# Patient Record
Sex: MALE | Race: WHITE | NOT HISPANIC OR LATINO | Employment: UNEMPLOYED | URBAN - METROPOLITAN AREA
[De-identification: names, ages, dates, MRNs, and addresses within clinical notes are randomized per-mention and may not be internally consistent; named-entity substitution may affect disease eponyms.]

---

## 2017-05-22 ENCOUNTER — HISTORICAL (OUTPATIENT)
Dept: ADMINISTRATIVE | Facility: HOSPITAL | Age: 27
End: 2017-05-22

## 2017-07-28 ENCOUNTER — HISTORICAL (OUTPATIENT)
Dept: ADMINISTRATIVE | Facility: HOSPITAL | Age: 27
End: 2017-07-28

## 2017-07-28 LAB
ABS NEUT (OLG): 2.6 X10(3)/MCL (ref 2.1–9.2)
ALBUMIN SERPL-MCNC: 3.8 GM/DL (ref 3.4–5)
ALBUMIN/GLOB SERPL: 1 RATIO (ref 1–2)
ALP SERPL-CCNC: 71 UNIT/L (ref 45–117)
ALT SERPL-CCNC: 30 UNIT/L (ref 12–78)
AMPHET UR QL SCN: POSITIVE
APAP SERPL-MCNC: <2 MCG/ML (ref 10–30)
APPEARANCE, UA: CLEAR
AST SERPL-CCNC: 17 UNIT/L (ref 15–37)
BACTERIA #/AREA URNS AUTO: ABNORMAL /[HPF]
BARBITURATE SCN PRESENT UR: NEGATIVE
BASOPHILS # BLD AUTO: 0.04 X10(3)/MCL
BASOPHILS NFR BLD AUTO: 1 % (ref 0–1)
BENZODIAZ UR QL SCN: POSITIVE
BILIRUB SERPL-MCNC: 0.2 MG/DL (ref 0.2–1)
BILIRUB UR QL STRIP: NEGATIVE
BILIRUBIN DIRECT+TOT PNL SERPL-MCNC: <0.1 MG/DL
BILIRUBIN DIRECT+TOT PNL SERPL-MCNC: >0.1 MG/DL
BUN SERPL-MCNC: 5 MG/DL (ref 7–18)
CALCIUM SERPL-MCNC: 9 MG/DL (ref 8.5–10.1)
CANNABINOIDS UR QL SCN: POSITIVE
CHLORIDE SERPL-SCNC: 107 MMOL/L (ref 98–107)
CO2 SERPL-SCNC: 29 MMOL/L (ref 21–32)
COCAINE UR QL SCN: NEGATIVE
COLOR UR: YELLOW
CREAT SERPL-MCNC: 1 MG/DL (ref 0.6–1.3)
EOSINOPHIL # BLD AUTO: 0.1 10*3/UL
EOSINOPHIL NFR BLD AUTO: 2 % (ref 0–5)
ERYTHROCYTE [DISTWIDTH] IN BLOOD BY AUTOMATED COUNT: 11.9 % (ref 11.5–14.5)
ETHANOL SERPL-MCNC: <3 MG/DL
GLOBULIN SER-MCNC: 3.8 GM/ML (ref 2.3–3.5)
GLUCOSE (UA): NORMAL
GLUCOSE SERPL-MCNC: 94 MG/DL (ref 74–106)
HCT VFR BLD AUTO: 42.6 % (ref 40–51)
HGB BLD-MCNC: 15.3 GM/DL (ref 13.5–17.5)
HGB UR QL STRIP: NEGATIVE
HYALINE CASTS #/AREA URNS LPF: ABNORMAL /[LPF]
IMM GRANULOCYTES # BLD AUTO: 0.01 10*3/UL
IMM GRANULOCYTES NFR BLD AUTO: 0 %
KETONES UR QL STRIP: ABNORMAL
LEUKOCYTE ESTERASE UR QL STRIP: NEGATIVE
LYMPHOCYTES # BLD AUTO: 1.17 X10(3)/MCL
LYMPHOCYTES NFR BLD AUTO: 27 % (ref 15–40)
MCH RBC QN AUTO: 31.9 PG (ref 26–34)
MCHC RBC AUTO-ENTMCNC: 35.9 GM/DL (ref 31–37)
MCV RBC AUTO: 88.9 FL (ref 80–100)
MONOCYTES # BLD AUTO: 0.37 X10(3)/MCL
MONOCYTES NFR BLD AUTO: 9 % (ref 4–12)
NEUTROPHILS # BLD AUTO: 2.6 X10(3)/MCL
NEUTROPHILS NFR BLD AUTO: 61 X10(3)/MCL
NITRITE UR QL STRIP: NEGATIVE
OPIATES UR QL SCN: POSITIVE
PCP UR QL: NEGATIVE
PH UR STRIP.AUTO: 6 [PH] (ref 5–8)
PH UR STRIP: 6 [PH] (ref 4.5–8)
PLATELET # BLD AUTO: 338 X10(3)/MCL (ref 130–400)
PMV BLD AUTO: 9 FL (ref 7.4–10.4)
POTASSIUM SERPL-SCNC: 4 MMOL/L (ref 3.5–5.1)
PROT SERPL-MCNC: 7.6 GM/DL (ref 6.4–8.2)
PROT UR QL STRIP: 50 MG/DL
RBC # BLD AUTO: 4.79 X10(6)/MCL (ref 4.5–5.9)
RBC #/AREA URNS AUTO: ABNORMAL /[HPF]
SALICYLATES SERPL-MCNC: <1.7 MG/DL (ref 2.8–20)
SODIUM SERPL-SCNC: 143 MMOL/L (ref 136–145)
SP GR UR STRIP: 1.03 (ref 1–1.03)
SQUAMOUS #/AREA URNS LPF: ABNORMAL /[LPF]
T4 FREE SERPL-MCNC: 0.97 NG/DL (ref 0.76–1.46)
TEMPERATURE, URINE (OHS): 22.5 DEGC (ref 20–25)
TSH SERPL-ACNC: 0.6 MIU/L (ref 0.36–3.74)
UROBILINOGEN UR STRIP-ACNC: 2 MG/DL
WBC # SPEC AUTO: 4.3 X10(3)/MCL (ref 4.5–11)
WBC #/AREA URNS AUTO: ABNORMAL /HPF

## 2018-03-23 ENCOUNTER — HISTORICAL (OUTPATIENT)
Dept: ADMINISTRATIVE | Facility: HOSPITAL | Age: 28
End: 2018-03-23

## 2018-03-29 LAB
FINAL CULTURE: NORMAL
FINAL CULTURE: NORMAL

## 2020-12-27 ENCOUNTER — HISTORICAL (OUTPATIENT)
Dept: ADMINISTRATIVE | Facility: HOSPITAL | Age: 30
End: 2020-12-27

## 2022-09-06 ENCOUNTER — HOSPITAL ENCOUNTER (OUTPATIENT)
Facility: HOSPITAL | Age: 32
Discharge: LEFT BEFORE DISCHARGE | DRG: 872 | End: 2022-09-07
Attending: STUDENT IN AN ORGANIZED HEALTH CARE EDUCATION/TRAINING PROGRAM | Admitting: INTERNAL MEDICINE
Payer: MEDICAID

## 2022-09-06 DIAGNOSIS — R00.0 TACHYCARDIA: ICD-10-CM

## 2022-09-06 DIAGNOSIS — K81.9 CHOLECYSTITIS: Primary | ICD-10-CM

## 2022-09-06 DIAGNOSIS — R10.9 FLANK PAIN: ICD-10-CM

## 2022-09-06 DIAGNOSIS — A41.9 SEPSIS WITHOUT ACUTE ORGAN DYSFUNCTION, DUE TO UNSPECIFIED ORGANISM: ICD-10-CM

## 2022-09-06 DIAGNOSIS — F19.90 IVDU (INTRAVENOUS DRUG USER): ICD-10-CM

## 2022-09-06 DIAGNOSIS — I47.10 SVT (SUPRAVENTRICULAR TACHYCARDIA): ICD-10-CM

## 2022-09-06 PROCEDURE — 96375 TX/PRO/DX INJ NEW DRUG ADDON: CPT

## 2022-09-06 PROCEDURE — G0378 HOSPITAL OBSERVATION PER HR: HCPCS

## 2022-09-06 PROCEDURE — 99291 CRITICAL CARE FIRST HOUR: CPT | Mod: 25

## 2022-09-06 PROCEDURE — 96374 THER/PROPH/DIAG INJ IV PUSH: CPT

## 2022-09-06 PROCEDURE — G0379 DIRECT REFER HOSPITAL OBSERV: HCPCS

## 2022-09-06 PROCEDURE — 21400001 HC TELEMETRY ROOM

## 2022-09-06 PROCEDURE — 96361 HYDRATE IV INFUSION ADD-ON: CPT

## 2022-09-06 RX ORDER — HYDROMORPHONE HYDROCHLORIDE 2 MG/ML
1 INJECTION, SOLUTION INTRAMUSCULAR; INTRAVENOUS; SUBCUTANEOUS
Status: COMPLETED | OUTPATIENT
Start: 2022-09-07 | End: 2022-09-07

## 2022-09-06 NOTE — Clinical Note
Diagnosis: Cholecystitis [114618]   Admitting Provider:: BOLIVAR HAGEN [[id number]]   Future Attending Provider: BOLIVAR HAGEN [[id number]]   Reason for IP Medical Treatment  (Clinical interventions that can only be accomplished in the IP setting? ) :: Surgery, IV fluids, IV antibiotics   Estimated Length of Stay:: 2 midnights   I certify that Inpatient services for greater than or equal to 2 midnights are medically necessary:: Yes   Plans for Post-Acute care--if anticipated (pick the single best option):: A. No post acute care anticipated at this time

## 2022-09-07 ENCOUNTER — HOSPITAL ENCOUNTER (OUTPATIENT)
Facility: HOSPITAL | Age: 32
LOS: 1 days | Discharge: LEFT BEFORE DISCHARGE | End: 2022-09-07
Attending: STUDENT IN AN ORGANIZED HEALTH CARE EDUCATION/TRAINING PROGRAM | Admitting: INTERNAL MEDICINE
Payer: MEDICAID

## 2022-09-07 VITALS
SYSTOLIC BLOOD PRESSURE: 116 MMHG | TEMPERATURE: 99 F | DIASTOLIC BLOOD PRESSURE: 66 MMHG | HEIGHT: 70 IN | RESPIRATION RATE: 12 BRPM | OXYGEN SATURATION: 98 % | BODY MASS INDEX: 24.34 KG/M2 | HEART RATE: 112 BPM | WEIGHT: 170 LBS

## 2022-09-07 VITALS
HEIGHT: 70 IN | RESPIRATION RATE: 17 BRPM | HEART RATE: 84 BPM | TEMPERATURE: 97 F | WEIGHT: 188 LBS | DIASTOLIC BLOOD PRESSURE: 88 MMHG | BODY MASS INDEX: 26.92 KG/M2 | SYSTOLIC BLOOD PRESSURE: 137 MMHG | OXYGEN SATURATION: 100 %

## 2022-09-07 DIAGNOSIS — F19.90 IVDU (INTRAVENOUS DRUG USER): ICD-10-CM

## 2022-09-07 DIAGNOSIS — I33.0 VEGETATIVE ENDOCARDITIS: ICD-10-CM

## 2022-09-07 DIAGNOSIS — I47.10 SVT (SUPRAVENTRICULAR TACHYCARDIA): ICD-10-CM

## 2022-09-07 DIAGNOSIS — K81.9 CHOLECYSTITIS: Primary | ICD-10-CM

## 2022-09-07 DIAGNOSIS — A41.9 SEPSIS, DUE TO UNSPECIFIED ORGANISM, UNSPECIFIED WHETHER ACUTE ORGAN DYSFUNCTION PRESENT: ICD-10-CM

## 2022-09-07 PROBLEM — F19.10 POLYSUBSTANCE ABUSE: Status: ACTIVE | Noted: 2022-09-07

## 2022-09-07 PROBLEM — K80.00 CALCULUS OF GALLBLADDER WITH ACUTE CHOLECYSTITIS WITHOUT OBSTRUCTION: Status: ACTIVE | Noted: 2022-09-07

## 2022-09-07 PROBLEM — E83.42 HYPOMAGNESEMIA: Status: ACTIVE | Noted: 2022-09-07

## 2022-09-07 LAB
ABS NEUT CALC (OHS): 1.79 X10(3)/MCL (ref 2.1–9.2)
ALBUMIN SERPL-MCNC: 3.2 GM/DL (ref 3.5–5)
ALBUMIN SERPL-MCNC: 3.2 GM/DL (ref 3.5–5)
ALBUMIN/GLOB SERPL: 1 RATIO (ref 1.1–2)
ALBUMIN/GLOB SERPL: 1 RATIO (ref 1.1–2)
ALP SERPL-CCNC: 103 UNIT/L (ref 40–150)
ALP SERPL-CCNC: 105 UNIT/L (ref 40–150)
ALT SERPL-CCNC: 10 UNIT/L (ref 0–55)
ALT SERPL-CCNC: 34 UNIT/L (ref 0–55)
AMPHET UR QL SCN: POSITIVE
APPEARANCE UR: CLEAR
APTT PPP: 30.8 SECONDS (ref 23.2–33.7)
AST SERPL-CCNC: 15 UNIT/L (ref 5–34)
AST SERPL-CCNC: 59 UNIT/L (ref 5–34)
BACTERIA #/AREA URNS AUTO: ABNORMAL /HPF
BARBITURATE SCN PRESENT UR: NEGATIVE
BASOPHILS # BLD AUTO: 0.02 X10(3)/MCL (ref 0–0.2)
BASOPHILS NFR BLD AUTO: 0.2 %
BENZODIAZ UR QL SCN: NEGATIVE
BILIRUB UR QL STRIP.AUTO: NEGATIVE MG/DL
BILIRUBIN DIRECT+TOT PNL SERPL-MCNC: 0.7 MG/DL
BILIRUBIN DIRECT+TOT PNL SERPL-MCNC: 1.1 MG/DL
BUN SERPL-MCNC: 8 MG/DL (ref 8.9–20.6)
BUN SERPL-MCNC: 9 MG/DL (ref 8.9–20.6)
CALCIUM SERPL-MCNC: 9 MG/DL (ref 8.4–10.2)
CALCIUM SERPL-MCNC: 9.4 MG/DL (ref 8.4–10.2)
CANNABINOIDS UR QL SCN: POSITIVE
CHLORIDE SERPL-SCNC: 104 MMOL/L (ref 98–107)
CHLORIDE SERPL-SCNC: 107 MMOL/L (ref 98–107)
CO2 SERPL-SCNC: 23 MMOL/L (ref 22–29)
CO2 SERPL-SCNC: 24 MMOL/L (ref 22–29)
COCAINE UR QL SCN: NEGATIVE
COLOR UR AUTO: YELLOW
CREAT SERPL-MCNC: 0.96 MG/DL (ref 0.73–1.18)
CREAT SERPL-MCNC: 1.04 MG/DL (ref 0.73–1.18)
EOSINOPHIL # BLD AUTO: 0.02 X10(3)/MCL (ref 0–0.9)
EOSINOPHIL NFR BLD AUTO: 0.2 %
EOSINOPHIL NFR BLD MANUAL: 0.05 X10(3)/MCL (ref 0–0.9)
EOSINOPHIL NFR BLD MANUAL: 2 % (ref 0–8)
ERYTHROCYTE [DISTWIDTH] IN BLOOD BY AUTOMATED COUNT: 12.4 % (ref 11.5–17)
ERYTHROCYTE [DISTWIDTH] IN BLOOD BY AUTOMATED COUNT: 12.8 % (ref 11.5–17)
ETHANOL SERPL-MCNC: <10 MG/DL
FENTANYL UR QL SCN: POSITIVE
FLUAV AG UPPER RESP QL IA.RAPID: NOT DETECTED
FLUBV AG UPPER RESP QL IA.RAPID: NOT DETECTED
GFR SERPLBLD CREATININE-BSD FMLA CKD-EPI: >60 MLS/MIN/1.73/M2
GFR SERPLBLD CREATININE-BSD FMLA CKD-EPI: >60 MLS/MIN/1.73/M2
GLOBULIN SER-MCNC: 3.1 GM/DL (ref 2.4–3.5)
GLOBULIN SER-MCNC: 3.2 GM/DL (ref 2.4–3.5)
GLUCOSE SERPL-MCNC: 101 MG/DL (ref 74–100)
GLUCOSE SERPL-MCNC: 96 MG/DL (ref 74–100)
GLUCOSE UR QL STRIP.AUTO: NEGATIVE MG/DL
HCT VFR BLD AUTO: 36.5 % (ref 42–52)
HCT VFR BLD AUTO: 38.7 % (ref 42–52)
HGB BLD-MCNC: 11.9 GM/DL (ref 14–18)
HGB BLD-MCNC: 12.6 GM/DL (ref 14–18)
HIV 1+2 AB+HIV1 P24 AG SERPL QL IA: NONREACTIVE
IMM GRANULOCYTES # BLD AUTO: 0.02 X10(3)/MCL (ref 0–0.04)
IMM GRANULOCYTES # BLD AUTO: 0.03 X10(3)/MCL (ref 0–0.04)
IMM GRANULOCYTES NFR BLD AUTO: 0.3 %
IMM GRANULOCYTES NFR BLD AUTO: 0.9 %
INR BLD: 1.25 (ref 0–1.3)
KETONES UR QL STRIP.AUTO: NEGATIVE MG/DL
LACTATE SERPL-SCNC: 3 MMOL/L (ref 0.5–2.2)
LACTATE SERPL-SCNC: 3.6 MMOL/L (ref 0.5–2.2)
LACTATE SERPL-SCNC: 4.3 MMOL/L (ref 0.5–2.2)
LACTATE SERPL-SCNC: 4.4 MMOL/L (ref 0.5–2.2)
LACTATE SERPL-SCNC: 4.4 MMOL/L (ref 0.5–2.2)
LACTATE SERPL-SCNC: 4.5 MMOL/L (ref 0.5–2.2)
LACTATE SERPL-SCNC: 4.6 MMOL/L (ref 0.5–2.2)
LEUKOCYTE ESTERASE UR QL STRIP.AUTO: NEGATIVE UNIT/L
LIPASE SERPL-CCNC: 13 U/L
LYMPHOCYTES # BLD AUTO: 0.32 X10(3)/MCL (ref 0.6–4.6)
LYMPHOCYTES NFR BLD AUTO: 3.4 %
LYMPHOCYTES NFR BLD MANUAL: 0.34 X10(3)/MCL
LYMPHOCYTES NFR BLD MANUAL: 15 % (ref 13–40)
MAGNESIUM SERPL-MCNC: 1.3 MG/DL (ref 1.6–2.6)
MCH RBC QN AUTO: 29.2 PG (ref 27–31)
MCH RBC QN AUTO: 29.4 PG (ref 27–31)
MCHC RBC AUTO-ENTMCNC: 32.6 MG/DL (ref 33–36)
MCHC RBC AUTO-ENTMCNC: 32.6 MG/DL (ref 33–36)
MCV RBC AUTO: 89.6 FL (ref 80–94)
MCV RBC AUTO: 90.1 FL (ref 80–94)
MDMA UR QL SCN: NEGATIVE
MONOCYTES # BLD AUTO: 0.11 X10(3)/MCL (ref 0.1–1.3)
MONOCYTES NFR BLD AUTO: 1.2 %
MONOCYTES NFR BLD MANUAL: 0.12 X10(3)/MCL (ref 0.1–1.3)
MONOCYTES NFR BLD MANUAL: 5 % (ref 2–11)
NEUTROPHILS # BLD AUTO: 8.9 X10(3)/MCL (ref 2.1–9.2)
NEUTROPHILS NFR BLD AUTO: 94.7 %
NEUTROPHILS NFR BLD MANUAL: 77 % (ref 47–80)
NEUTS BAND NFR BLD MANUAL: 1 % (ref 0–11)
NITRITE UR QL STRIP.AUTO: NEGATIVE
OPIATES UR QL SCN: NEGATIVE
PCP UR QL: NEGATIVE
PH UR STRIP.AUTO: 7 [PH]
PH UR: 7 [PH] (ref 3–11)
PLATELET # BLD AUTO: 132 X10(3)/MCL (ref 130–400)
PLATELET # BLD AUTO: 144 X10(3)/MCL (ref 130–400)
PLATELET # BLD EST: ADEQUATE 10*3/UL
PMV BLD AUTO: 8.1 FL (ref 7.4–10.4)
PMV BLD AUTO: 8.7 FL (ref 7.4–10.4)
POTASSIUM SERPL-SCNC: 3.6 MMOL/L (ref 3.5–5.1)
POTASSIUM SERPL-SCNC: 4.4 MMOL/L (ref 3.5–5.1)
PROT SERPL-MCNC: 6.3 GM/DL (ref 6.4–8.3)
PROT SERPL-MCNC: 6.4 GM/DL (ref 6.4–8.3)
PROT UR QL STRIP.AUTO: NEGATIVE MG/DL
PROTHROMBIN TIME: 15.5 SECONDS (ref 12.5–14.5)
RBC # BLD AUTO: 4.05 X10(6)/MCL (ref 4.7–6.1)
RBC # BLD AUTO: 4.32 X10(6)/MCL (ref 4.7–6.1)
RBC #/AREA URNS AUTO: ABNORMAL /HPF
RBC MORPH BLD: NORMAL
RBC UR QL AUTO: ABNORMAL UNIT/L
SARS-COV-2 RNA RESP QL NAA+PROBE: NOT DETECTED
SODIUM SERPL-SCNC: 140 MMOL/L (ref 136–145)
SODIUM SERPL-SCNC: 143 MMOL/L (ref 136–145)
SP GR UR STRIP.AUTO: 1.01
SPECIFIC GRAVITY, URINE AUTO (.000) (OHS): 1.01 (ref 1–1.03)
SPERM URNS QL MICRO: ABNORMAL /HPF
SQUAMOUS #/AREA URNS AUTO: ABNORMAL /HPF
TROPONIN I SERPL-MCNC: <0.01 NG/ML (ref 0–0.04)
TSH SERPL-ACNC: 0.45 UIU/ML (ref 0.35–4.94)
UROBILINOGEN UR STRIP-ACNC: 0.2 MG/DL
WBC # SPEC AUTO: 2.3 X10(3)/MCL (ref 4.5–11.5)
WBC # SPEC AUTO: 9.4 X10(3)/MCL (ref 4.5–11.5)
WBC #/AREA URNS AUTO: ABNORMAL /HPF

## 2022-09-07 PROCEDURE — 99285 EMERGENCY DEPT VISIT HI MDM: CPT | Mod: 25

## 2022-09-07 PROCEDURE — 96374 THER/PROPH/DIAG INJ IV PUSH: CPT | Mod: 59

## 2022-09-07 PROCEDURE — 25000003 PHARM REV CODE 250: Performed by: STUDENT IN AN ORGANIZED HEALTH CARE EDUCATION/TRAINING PROGRAM

## 2022-09-07 PROCEDURE — 85730 THROMBOPLASTIN TIME PARTIAL: CPT | Performed by: STUDENT IN AN ORGANIZED HEALTH CARE EDUCATION/TRAINING PROGRAM

## 2022-09-07 PROCEDURE — 85025 COMPLETE CBC W/AUTO DIFF WBC: CPT | Performed by: STUDENT IN AN ORGANIZED HEALTH CARE EDUCATION/TRAINING PROGRAM

## 2022-09-07 PROCEDURE — 63600175 PHARM REV CODE 636 W HCPCS: Performed by: INTERNAL MEDICINE

## 2022-09-07 PROCEDURE — 85610 PROTHROMBIN TIME: CPT | Performed by: STUDENT IN AN ORGANIZED HEALTH CARE EDUCATION/TRAINING PROGRAM

## 2022-09-07 PROCEDURE — 82077 ASSAY SPEC XCP UR&BREATH IA: CPT | Performed by: STUDENT IN AN ORGANIZED HEALTH CARE EDUCATION/TRAINING PROGRAM

## 2022-09-07 PROCEDURE — 96365 THER/PROPH/DIAG IV INF INIT: CPT | Mod: 59

## 2022-09-07 PROCEDURE — 87040 BLOOD CULTURE FOR BACTERIA: CPT | Performed by: STUDENT IN AN ORGANIZED HEALTH CARE EDUCATION/TRAINING PROGRAM

## 2022-09-07 PROCEDURE — 83735 ASSAY OF MAGNESIUM: CPT | Performed by: STUDENT IN AN ORGANIZED HEALTH CARE EDUCATION/TRAINING PROGRAM

## 2022-09-07 PROCEDURE — G0378 HOSPITAL OBSERVATION PER HR: HCPCS

## 2022-09-07 PROCEDURE — 80053 COMPREHEN METABOLIC PANEL: CPT | Performed by: STUDENT IN AN ORGANIZED HEALTH CARE EDUCATION/TRAINING PROGRAM

## 2022-09-07 PROCEDURE — 80307 DRUG TEST PRSMV CHEM ANLYZR: CPT | Performed by: STUDENT IN AN ORGANIZED HEALTH CARE EDUCATION/TRAINING PROGRAM

## 2022-09-07 PROCEDURE — 63600175 PHARM REV CODE 636 W HCPCS: Performed by: STUDENT IN AN ORGANIZED HEALTH CARE EDUCATION/TRAINING PROGRAM

## 2022-09-07 PROCEDURE — 63600175 PHARM REV CODE 636 W HCPCS: Performed by: SURGERY

## 2022-09-07 PROCEDURE — 84484 ASSAY OF TROPONIN QUANT: CPT | Performed by: STUDENT IN AN ORGANIZED HEALTH CARE EDUCATION/TRAINING PROGRAM

## 2022-09-07 PROCEDURE — 93005 ELECTROCARDIOGRAM TRACING: CPT

## 2022-09-07 PROCEDURE — 83605 ASSAY OF LACTIC ACID: CPT | Performed by: STUDENT IN AN ORGANIZED HEALTH CARE EDUCATION/TRAINING PROGRAM

## 2022-09-07 PROCEDURE — 84443 ASSAY THYROID STIM HORMONE: CPT | Performed by: STUDENT IN AN ORGANIZED HEALTH CARE EDUCATION/TRAINING PROGRAM

## 2022-09-07 PROCEDURE — 81001 URINALYSIS AUTO W/SCOPE: CPT | Performed by: STUDENT IN AN ORGANIZED HEALTH CARE EDUCATION/TRAINING PROGRAM

## 2022-09-07 PROCEDURE — 87636 SARSCOV2 & INF A&B AMP PRB: CPT | Performed by: STUDENT IN AN ORGANIZED HEALTH CARE EDUCATION/TRAINING PROGRAM

## 2022-09-07 PROCEDURE — 96375 TX/PRO/DX INJ NEW DRUG ADDON: CPT

## 2022-09-07 PROCEDURE — 83690 ASSAY OF LIPASE: CPT | Performed by: STUDENT IN AN ORGANIZED HEALTH CARE EDUCATION/TRAINING PROGRAM

## 2022-09-07 PROCEDURE — 96361 HYDRATE IV INFUSION ADD-ON: CPT

## 2022-09-07 PROCEDURE — 85027 COMPLETE CBC AUTOMATED: CPT | Performed by: STUDENT IN AN ORGANIZED HEALTH CARE EDUCATION/TRAINING PROGRAM

## 2022-09-07 PROCEDURE — 36415 COLL VENOUS BLD VENIPUNCTURE: CPT | Performed by: STUDENT IN AN ORGANIZED HEALTH CARE EDUCATION/TRAINING PROGRAM

## 2022-09-07 PROCEDURE — S0030 INJECTION, METRONIDAZOLE: HCPCS | Performed by: STUDENT IN AN ORGANIZED HEALTH CARE EDUCATION/TRAINING PROGRAM

## 2022-09-07 PROCEDURE — 87389 HIV-1 AG W/HIV-1&-2 AB AG IA: CPT | Performed by: STUDENT IN AN ORGANIZED HEALTH CARE EDUCATION/TRAINING PROGRAM

## 2022-09-07 PROCEDURE — 25500020 PHARM REV CODE 255: Performed by: STUDENT IN AN ORGANIZED HEALTH CARE EDUCATION/TRAINING PROGRAM

## 2022-09-07 RX ORDER — METRONIDAZOLE 500 MG/100ML
500 INJECTION, SOLUTION INTRAVENOUS
Status: DISCONTINUED | OUTPATIENT
Start: 2022-09-07 | End: 2022-09-07 | Stop reason: HOSPADM

## 2022-09-07 RX ORDER — MAGNESIUM SULFATE HEPTAHYDRATE 40 MG/ML
4 INJECTION, SOLUTION INTRAVENOUS ONCE
Status: COMPLETED | OUTPATIENT
Start: 2022-09-07 | End: 2022-09-07

## 2022-09-07 RX ORDER — SODIUM CHLORIDE 0.9 % (FLUSH) 0.9 %
10 SYRINGE (ML) INJECTION
Status: DISCONTINUED | OUTPATIENT
Start: 2022-09-07 | End: 2022-09-07 | Stop reason: HOSPADM

## 2022-09-07 RX ORDER — DEXTROSE MONOHYDRATE AND SODIUM CHLORIDE 5; .9 G/100ML; G/100ML
INJECTION, SOLUTION INTRAVENOUS CONTINUOUS
Status: DISCONTINUED | OUTPATIENT
Start: 2022-09-07 | End: 2022-09-07 | Stop reason: HOSPADM

## 2022-09-07 RX ORDER — MAGNESIUM SULFATE HEPTAHYDRATE 40 MG/ML
2 INJECTION, SOLUTION INTRAVENOUS
Status: COMPLETED | OUTPATIENT
Start: 2022-09-07 | End: 2022-09-07

## 2022-09-07 RX ORDER — SODIUM CHLORIDE, SODIUM LACTATE, POTASSIUM CHLORIDE, CALCIUM CHLORIDE 600; 310; 30; 20 MG/100ML; MG/100ML; MG/100ML; MG/100ML
1000 INJECTION, SOLUTION INTRAVENOUS
Status: COMPLETED | OUTPATIENT
Start: 2022-09-07 | End: 2022-09-07

## 2022-09-07 RX ORDER — HYDROMORPHONE HYDROCHLORIDE 2 MG/ML
0.5 INJECTION, SOLUTION INTRAMUSCULAR; INTRAVENOUS; SUBCUTANEOUS EVERY 4 HOURS PRN
Status: DISCONTINUED | OUTPATIENT
Start: 2022-09-07 | End: 2022-09-07 | Stop reason: HOSPADM

## 2022-09-07 RX ORDER — ONDANSETRON 2 MG/ML
4 INJECTION INTRAMUSCULAR; INTRAVENOUS EVERY 8 HOURS PRN
Status: DISCONTINUED | OUTPATIENT
Start: 2022-09-07 | End: 2022-09-07 | Stop reason: HOSPADM

## 2022-09-07 RX ORDER — TALC
6 POWDER (GRAM) TOPICAL NIGHTLY PRN
Status: DISCONTINUED | OUTPATIENT
Start: 2022-09-07 | End: 2022-09-07 | Stop reason: HOSPADM

## 2022-09-07 RX ORDER — KETOROLAC TROMETHAMINE 30 MG/ML
30 INJECTION, SOLUTION INTRAMUSCULAR; INTRAVENOUS
Status: COMPLETED | OUTPATIENT
Start: 2022-09-07 | End: 2022-09-07

## 2022-09-07 RX ADMIN — PIPERACILLIN SODIUM AND TAZOBACTAM SODIUM 4.5 G: 4; .5 INJECTION, POWDER, LYOPHILIZED, FOR SOLUTION INTRAVENOUS at 03:09

## 2022-09-07 RX ADMIN — METRONIDAZOLE 500 MG: 500 INJECTION, SOLUTION INTRAVENOUS at 06:09

## 2022-09-07 RX ADMIN — HYDROMORPHONE HYDROCHLORIDE 0.5 MG: 2 INJECTION, SOLUTION INTRAMUSCULAR; INTRAVENOUS; SUBCUTANEOUS at 03:09

## 2022-09-07 RX ADMIN — SODIUM CHLORIDE, POTASSIUM CHLORIDE, SODIUM LACTATE AND CALCIUM CHLORIDE 1000 ML: 600; 310; 30; 20 INJECTION, SOLUTION INTRAVENOUS at 03:09

## 2022-09-07 RX ADMIN — IOPAMIDOL 100 ML: 755 INJECTION, SOLUTION INTRAVENOUS at 02:09

## 2022-09-07 RX ADMIN — MAGNESIUM SULFATE HEPTAHYDRATE 4 G: 40 INJECTION, SOLUTION INTRAVENOUS at 11:09

## 2022-09-07 RX ADMIN — SODIUM CHLORIDE, POTASSIUM CHLORIDE, SODIUM LACTATE AND CALCIUM CHLORIDE 1000 ML: 600; 310; 30; 20 INJECTION, SOLUTION INTRAVENOUS at 12:09

## 2022-09-07 RX ADMIN — DEXTROSE AND SODIUM CHLORIDE: 5; 900 INJECTION, SOLUTION INTRAVENOUS at 10:09

## 2022-09-07 RX ADMIN — KETOROLAC TROMETHAMINE 30 MG: 30 INJECTION, SOLUTION INTRAMUSCULAR; INTRAVENOUS at 12:09

## 2022-09-07 RX ADMIN — HYDROMORPHONE HYDROCHLORIDE 1 MG: 2 INJECTION, SOLUTION INTRAMUSCULAR; INTRAVENOUS; SUBCUTANEOUS at 12:09

## 2022-09-07 RX ADMIN — METRONIDAZOLE 500 MG: 500 INJECTION, SOLUTION INTRAVENOUS at 03:09

## 2022-09-07 RX ADMIN — ACETAMINOPHEN 975 MG: 325 SUPPOSITORY RECTAL at 12:09

## 2022-09-07 RX ADMIN — ONDANSETRON 4 MG: 2 INJECTION INTRAMUSCULAR; INTRAVENOUS at 02:09

## 2022-09-07 RX ADMIN — ONDANSETRON 4 MG: 2 INJECTION INTRAMUSCULAR; INTRAVENOUS at 11:09

## 2022-09-07 RX ADMIN — MAGNESIUM SULFATE HEPTAHYDRATE 2 G: 40 INJECTION, SOLUTION INTRAVENOUS at 01:09

## 2022-09-07 NOTE — ED PROVIDER NOTES
Encounter Date: 9/6/2022       History     Chief Complaint   Patient presents with    Flank Pain     Pt brought in by [facility] with c/o lydia flank pain x 1.5 hrs.     HPI    31-year-old male with a past medical history of kidney stones in the past as well as meth and heroin use via IVDU with last use being yesterday presents emergency department for my kidneys are failing.  Patient states he started having severe kidney pain approximately 1-1/2-2 hours ago.  States like it is his kidney stone pain but 10 times worse.  States he also feels like he may be dehydrated disease not drink much water today.  States the pain is causing him to be nauseated.  Denies any blood in his urine.  States he is urinating like normal.    History is limited secondary to patient's pain.    Review of patient's allergies indicates:  No Known Allergies  History reviewed. No pertinent past medical history.  No past surgical history on file.  History reviewed. No pertinent family history.     Review of Systems   Constitutional:  Negative for fever.   Respiratory:  Negative for shortness of breath.    Cardiovascular:  Negative for chest pain.   Gastrointestinal:  Positive for nausea. Negative for abdominal pain, constipation, diarrhea and vomiting.   Genitourinary:  Positive for flank pain. Negative for decreased urine volume, difficulty urinating, hematuria and urgency.   All other systems reviewed and are negative.    Physical Exam     Initial Vitals [09/06/22 2348]   BP Pulse Resp Temp SpO2   134/83 (!) 127 20 99.2 °F (37.3 °C) 99 %      MAP       --         Physical Exam    Nursing note and vitals reviewed.  Constitutional: He appears well-developed and well-nourished. He appears distressed.   Cardiovascular:  Regular rhythm.   Tachycardia present.         Pulmonary/Chest: Breath sounds normal.   Abdominal: Abdomen is soft. Bowel sounds are normal. There is no abdominal tenderness.   There is right CVA tenderness (exaggerated).  There is left  CVA tenderness (exaggerated).   Musculoskeletal:         General: No tenderness. Normal range of motion.     Neurological: He is alert and oriented to person, place, and time.   Skin: Capillary refill takes less than 2 seconds.   Psychiatric: He has a normal mood and affect. Thought content normal.       ED Course   Critical Care    Date/Time: 9/7/2022 4:00 AM  Performed by: Gerry Nixon MD  Authorized by: Gerry Nixon MD   Direct patient critical care time: 40 minutes  Ordering / reviewing critical care time: 2 minutes  Documentation critical care time: 2 minutes  Consulting other physicians critical care time: 1 minutes  Total critical care time (exclusive of procedural time) : 45 minutes  Critical care time was exclusive of separately billable procedures and treating other patients.  Critical care was necessary to treat or prevent imminent or life-threatening deterioration of the following conditions: sepsis.  Critical care was time spent personally by me on the following activities: discussions with consultants, interpretation of cardiac output measurements, evaluation of patient's response to treatment, examination of patient, obtaining history from patient or surrogate, ordering and performing treatments and interventions, ordering and review of laboratory studies, ordering and review of radiographic studies and re-evaluation of patient's condition.      Labs Reviewed   COMPREHENSIVE METABOLIC PANEL - Abnormal; Notable for the following components:       Result Value    Blood Urea Nitrogen 8.0 (*)     Protein Total 6.3 (*)     Albumin Level 3.2 (*)     Albumin/Globulin Ratio 1.0 (*)     All other components within normal limits   URINALYSIS, REFLEX TO URINE CULTURE - Abnormal; Notable for the following components:    Blood, UA Trace-Intact (*)     All other components within normal limits   DRUG SCREEN, URINE (BEAKER) - Abnormal; Notable for the following components:    Amphetamines, Urine Positive  (*)     Cannabinoids, Urine Positive (*)     Fentanyl, Urine Positive (*)     All other components within normal limits    Narrative:     Cut off concentrations:    Amphetamines - 1000 ng/ml  Barbiturates - 200 ng/ml  Benzodiazepine - 200 ng/ml  Cannabinoids (THC) - 50 ng/ml  Cocaine - 300 ng/ml  Fentanyl - 1.0 ng/ml  MDMA - 500 ng/ml  Opiates - 300 ng/ml   Phencyclidine (PCP) - 25 ng/ml    Specimen submitted for drug analysis and tested for pH and specific gravity in order to evaluate sample integrity. Suspect tampering if specific gravity is <1.003 and/or pH is not within the range of 4.5 - 8.0  False negatives may result form substances such as bleach added to urine.  False positives may result for the presence of a substance with similar chemical structure to the drug or its metabolite.    This test provides only a PRELIMINARY analytical test result. A more specific alternate chemical method must be used in order to obtain a confirmed analytical result. Gas chromatography/mass spectrometry (GC/MS) is the preferred confirmatory method. Other chemical confirmation methods are available. Clinical consideration and professional judgement should be applied to any drug of abuse test result, particularly when preliminary positive results are used.    Positive results will be confirmed only at the physicians request. Unconfirmed screening results are to be used only for medical purposes (treatment).        CBC WITH DIFFERENTIAL - Abnormal; Notable for the following components:    WBC 2.3 (*)     RBC 4.05 (*)     Hgb 11.9 (*)     Hct 36.5 (*)     MCHC 32.6 (*)     All other components within normal limits   MANUAL DIFFERENTIAL - Abnormal; Notable for the following components:    Abs Neut calc 1.794 (*)     Abs Lymp 0.345 (*)     All other components within normal limits   MAGNESIUM - Abnormal; Notable for the following components:    Magnesium Level 1.30 (*)     All other components within normal limits   LACTIC ACID,  PLASMA - Abnormal; Notable for the following components:    Lactic Acid Level 3.6 (*)     All other components within normal limits   PROTIME-INR - Abnormal; Notable for the following components:    PT 15.5 (*)     All other components within normal limits   URINALYSIS, MICROSCOPIC - Abnormal; Notable for the following components:    Sperm, UA Rare (*)     All other components within normal limits   LACTIC ACID, PLASMA - Abnormal; Notable for the following components:    Lactic Acid Level 3.0 (*)     All other components within normal limits   APTT - Normal   ALCOHOL,MEDICAL (ETHANOL) - Normal   TROPONIN I - Normal   TSH - Normal   LIPASE - Normal   COVID/FLU A&B PCR - Normal   BLOOD CULTURE [facility]   BLOOD CULTURE [facility]   CBC W/ AUTO DIFFERENTIAL    Narrative:     The following orders were created for panel order CBC auto differential.  Procedure                               Abnormality         Status                     ---------                               -----------         ------                     CBC with Differential[[id number]]        Abnormal            Final result               Manual Differential[[id number]]          Abnormal            Final result                 Please view results for these tests on the individual orders.   HIV 1 / 2 ANTIBODY   LACTIC ACID, PLASMA   COMPREHENSIVE METABOLIC PANEL   CBC W/ AUTO DIFFERENTIAL    Narrative:     The following orders were created for panel order CBC auto differential.  Procedure                               Abnormality         Status                     ---------                               -----------         ------                     CBC with Differential[[id number]]                                                         Please view results for these tests on the individual orders.   CBC WITH DIFFERENTIAL     EKG Readings: (Independently Interpreted)   Rhythm: Supraventricular Tachycardia (SVT). Heart Rate: 215. Ectopy: No Ectopy. Conduction: Normal. ST  Segments: Non-Specific ST Segment Depression. T Waves: Normal. Axis: Normal. Clinical Impression: Supraventricular Tachycardia (SVT)   EKG: normal EKG, sinus tachycardia rhythm, rate is 123, rate has decreased and SVT has resolved.    Imaging Results              CT Abdomen Pelvis With Contrast (Preliminary result)  Result time 09/07/22 02:50:39      Preliminary result by Hardik Mccormack Jr., MD (09/07/22 02:50:39)                   Narrative:    START OF REPORT:  Technique: CT Scan of the chest abdomen and pelvis was performed with intravenous contrast with axial as well as sagittal and, coronal images.    Dosage Information: Automated Exposure Control was utilized.    Comparison: Comparison is with study [id number] 01:13:58.    Clinical History: Sepsis, chest, abdomen/pelvis with contrast.    Findings:  Artifact: Mild motion artifact is seen on some images.  Soft Tissues: Unremarkable.  Lines and Tubes: None.  Axilla: A few prominent lymph nodes are seen in the left greater than right axilla.  Neck: The visualized soft tissues of the neck appear unremarkable. The thyroid gland appear unremarkable.  Mediastinum: The mediastinal structures are within normal limits.  Heart: The heart appears unremarkable.  Aorta: Unremarkable appearing aorta.  Pulmonary Arteries: Unremarkable. No filling defects are seen in the pulmonary arteries to suggest pulmonary embolus.  Lungs: The lungs are clear with no focal infiltrate or airspace disease. There is mild non specific dependent change at the lung bases.  Pleura: No effusions or pneumothorax are identified.  Bony Structures:  Spine: Mild spondylolytic changes of the thoracic spine. Chronic appearing anterior wedge compression of the T9 -T11 thoracic vertebrae is seen.  Ribs: No rib fractures are identified.  Abdomen:  Abdominal Wall: No abdominal wall pathology is seen.  Liver: The liver appears unremarkable.  Biliary System: No intrahepatic or extrahepatic biliary  duct dilatation is seen.  Gallbladder: There is mild gallbladder distension. Mild gall bladder wall enhancement is seen at the fundus, series 16, image 79. There is moderate fluid surrounding the gall bladder in its fossa, Series 5, image 39. These findings are suggestive of acute cholecystitis. Correlate with clinical and laboratory findings as regards further evaluation and follow-up.  Pancreas: The pancreas appears unremarkable.  Spleen: The spleen appears unremarkable.  Adrenals: The adrenal glands appear unremarkable.  Kidneys: The kidneys appear unremarkable with no stones cysts masses or hydronephrosis.  Aorta: The abdominal aorta appears unremarkable.  IVC: Unremarkable.  Bowel:  Esophagus: The visualized esophagus appears unremarkable. Thickening versus underdistention of the distal esophagus. Correlate clinically as regards possible reflux esophagitis.  Stomach: The stomach appears unremarkable.  Duodenum: Unremarkable appearing duodenum.  Small Bowel: The small bowel appears unremarkable.  Colon: There is moderate stool in the colon which could reflect an element of constipation.  Appendix: The appendix appears unremarkable and is seen on. The appendicolith is seen again at the base of the appendiceal lumen without adjacent inflammatory changes to suggest appendicitis.  Peritoneum: No intraperitoneal free air or ascites is seen.    Pelvis:  Bladder: The bladder is nondistended however the bladder wall appears thickened after considering state of nondistension which could reflect an element of cystitis.  Male:  Prostate gland: The prostate gland appears unremarkable.    Bony structures:  Dorsal Spine: There is mild spondylosis of the visualized dorsal spine.  Bony Pelvis: The visualized bony structures of the pelvis appear unremarkable.      Impression:  1. Mild gall bladder wall enhancement is seen at the fundus, series 16, image 79. There is moderate fluid surrounding the gall bladder in its fossa,  Series 5, image 39. These findings are suggestive of acute cholecystitis. Correlate with clinical and laboratory findings as regards further evaluation and follow-up.  2. The bladder is nondistended however the bladder wall appears thickened after considering state of nondistension which could reflect an element of cystitis.  3. Details and other findings as discussed above.                          Preliminary result by Interface, Rad Results In (09/07/22 02:50:39)                   Narrative:    START OF REPORT:  Technique: CT Scan of the chest abdomen and pelvis was performed with intravenous contrast with axial as well as sagittal and, coronal images.    Dosage Information: Automated Exposure Control was utilized.    Comparison: Comparison is with study [id number] 01:13:58.    Clinical History: Sepsis, chest, abdomen/pelvis with contrast.    Findings:  Artifact: Mild motion artifact is seen on some images.  Soft Tissues: Unremarkable.  Lines and Tubes: None.  Axilla: A few prominent lymph nodes are seen in the left greater than right axilla.  Neck: The visualized soft tissues of the neck appear unremarkable. The thyroid gland appear unremarkable.  Mediastinum: The mediastinal structures are within normal limits.  Heart: The heart appears unremarkable.  Aorta: Unremarkable appearing aorta.  Pulmonary Arteries: Unremarkable. No filling defects are seen in the pulmonary arteries to suggest pulmonary embolus.  Lungs: The lungs are clear with no focal infiltrate or airspace disease. There is mild non specific dependent change at the lung bases.  Pleura: No effusions or pneumothorax are identified.  Bony Structures:  Spine: Mild spondylolytic changes of the thoracic spine. Chronic appearing anterior wedge compression of the T9 -T11 thoracic vertebrae is seen.  Ribs: No rib fractures are identified.  Abdomen:  Abdominal Wall: No abdominal wall pathology is seen.  Liver: The liver appears unremarkable.  Biliary  System: No intrahepatic or extrahepatic biliary duct dilatation is seen.  Gallbladder: There is mild gallbladder distension. Mild gall bladder wall enhancement is seen at the fundus, series 16, image 79. There is moderate fluid surrounding the gall bladder in its fossa, Series 5, image 39. These findings are suggestive of acute cholecystitis. Correlate with clinical and laboratory findings as regards further evaluation and follow-up.  Pancreas: The pancreas appears unremarkable.  Spleen: The spleen appears unremarkable.  Adrenals: The adrenal glands appear unremarkable.  Kidneys: The kidneys appear unremarkable with no stones cysts masses or hydronephrosis.  Aorta: The abdominal aorta appears unremarkable.  IVC: Unremarkable.  Bowel:  Esophagus: The visualized esophagus appears unremarkable. Thickening versus underdistention of the distal esophagus. Correlate clinically as regards possible reflux esophagitis.  Stomach: The stomach appears unremarkable.  Duodenum: Unremarkable appearing duodenum.  Small Bowel: The small bowel appears unremarkable.  Colon: There is moderate stool in the colon which could reflect an element of constipation.  Appendix: The appendix appears unremarkable and is seen on. The appendicolith is seen again at the base of the appendiceal lumen without adjacent inflammatory changes to suggest appendicitis.  Peritoneum: No intraperitoneal free air or ascites is seen.    Pelvis:  Bladder: The bladder is nondistended however the bladder wall appears thickened after considering state of nondistension which could reflect an element of cystitis.  Male:  Prostate gland: The prostate gland appears unremarkable.    Bony structures:  Dorsal Spine: There is mild spondylosis of the visualized dorsal spine.  Bony Pelvis: The visualized bony structures of the pelvis appear unremarkable.      Impression:  1. Mild gall bladder wall enhancement is seen at the fundus, series 16, image 79. There is moderate fluid  surrounding the gall bladder in its fossa, Series 5, image 39. These findings are suggestive of acute cholecystitis. Correlate with clinical and laboratory findings as regards further evaluation and follow-up.  2. The bladder is nondistended however the bladder wall appears thickened after considering state of nondistension which could reflect an element of cystitis.  3. Details and other findings as discussed above.                                         CT Abdomen Pelvis  Without Contrast (Preliminary result)  Result time 09/07/22 01:19:06      Preliminary result by Hardik Mccormack Jr., MD (09/07/22 01:19:06)                   Narrative:    START OF REPORT:  Technique: CT of the abdomen and pelvis was performed with axial images as well as sagittal and coronal reconstruction images without intravenous contrast.    Comparison: None available.    Clinical History: Bilateral acute flank pain.    Dosage Information: Automated Exposure Control was utilized.    Findings:  Lines and Tubes: None.  Thorax:  Lungs: There is mild nonspecific dependent change at the lung bases. No focal infiltrate or consolidation is seen.  Pleura: No effusions or thickening. No pneumothorax is seen.  Heart: The heart size is within normal limits.  Abdomen:  Abdominal Wall: No abdominal wall pathology is seen.  Liver: The liver appears unremarkable.  Biliary System: No intrahepatic or extrahepatic biliary duct dilatation is seen.  Gallbladder: The gallbladder appears unremarkable.  Pancreas: The pancreas appears unremarkable.  Spleen: The spleen appears unremarkable.  Adrenals: The adrenal glands appear unremarkable.  Kidneys: The kidneys appear unremarkable with no stones cysts masses or hydronephrosis.  Aorta: The abdominal aorta appears unremarkable.  IVC: Unremarkable.  Bowel:  Esophagus: The visualized esophagus appears unremarkable. There appears to be mild thickening versus underdistention of the distal esophagus. Correlate  clinically as regards possible reflux esophagitis.  Stomach: The stomach appears unremarkable.  Duodenum: Unremarkable appearing duodenum.  Small Bowel: The small bowel appears unremarkable.  Colon: There is moderate stool in the colon which could reflect an element of constipation.  Appendix: The appendix appears unremarkable and is partially seen on âImage 99, Series 3â. A 2.7 mm appendicolith is seen within the lumen at the base of the appendix. No inflammatory changes are seen in the right lower quadrant to suggest appendicitis.  Peritoneum: No intraperitoneal free air or ascites is seen.    Pelvis:  Bladder: The bladder is nondistended but appears otherwise unremarkable.  Male:  Prostate gland: The prostate gland appears unremarkable.    Bony structures:  Dorsal Spine: Multiple chronic appearing anterior wedge compressions are seen in the imaged lower thoracic vertebrae.  Bony Pelvis: The visualized bony structures of the pelvis appear unremarkable.    Miscellaneous: Borderline enlarged inguinal nodes.      Impression:  1. No acute intraabdominal or pelvic solid organ or bowel pathology identified. Details and findings as discussed.                          Preliminary result by Interface, Rad Results In (09/07/22 01:19:06)                   Narrative:    START OF REPORT:  Technique: CT of the abdomen and pelvis was performed with axial images as well as sagittal and coronal reconstruction images without intravenous contrast.    Comparison: None available.    Clinical History: Bilateral acute flank pain.    Dosage Information: Automated Exposure Control was utilized.    Findings:  Lines and Tubes: None.  Thorax:  Lungs: There is mild nonspecific dependent change at the lung bases. No focal infiltrate or consolidation is seen.  Pleura: No effusions or thickening. No pneumothorax is seen.  Heart: The heart size is within normal limits.  Abdomen:  Abdominal Wall: No abdominal wall pathology is seen.  Liver:  The liver appears unremarkable.  Biliary System: No intrahepatic or extrahepatic biliary duct dilatation is seen.  Gallbladder: The gallbladder appears unremarkable.  Pancreas: The pancreas appears unremarkable.  Spleen: The spleen appears unremarkable.  Adrenals: The adrenal glands appear unremarkable.  Kidneys: The kidneys appear unremarkable with no stones cysts masses or hydronephrosis.  Aorta: The abdominal aorta appears unremarkable.  IVC: Unremarkable.  Bowel:  Esophagus: The visualized esophagus appears unremarkable. There appears to be mild thickening versus underdistention of the distal esophagus. Correlate clinically as regards possible reflux esophagitis.  Stomach: The stomach appears unremarkable.  Duodenum: Unremarkable appearing duodenum.  Small Bowel: The small bowel appears unremarkable.  Colon: There is moderate stool in the colon which could reflect an element of constipation.  Appendix: The appendix appears unremarkable and is partially seen on âImage 99, Series 3â. A 2.7 mm appendicolith is seen within the lumen at the base of the appendix. No inflammatory changes are seen in the right lower quadrant to suggest appendicitis.  Peritoneum: No intraperitoneal free air or ascites is seen.    Pelvis:  Bladder: The bladder is nondistended but appears otherwise unremarkable.  Male:  Prostate gland: The prostate gland appears unremarkable.    Bony structures:  Dorsal Spine: Multiple chronic appearing anterior wedge compressions are seen in the imaged lower thoracic vertebrae.  Bony Pelvis: The visualized bony structures of the pelvis appear unremarkable.    Miscellaneous: Borderline enlarged inguinal nodes.      Impression:  1. No acute intraabdominal or pelvic solid organ or bowel pathology identified. Details and findings as discussed.                                         Medications   metronidazole IVPB 500 mg (has no administration in time range)   sodium chloride 0.9% flush 10 mL (has no  administration in time range)   melatonin tablet 6 mg (has no administration in time range)   HYDROmorphone (PF) injection 0.5 mg (has no administration in time range)   ondansetron injection 4 mg (has no administration in time range)   lactated ringers bolus 1,000 mL (0 mLs Intravenous Stopped 9/7/22 0100)   HYDROmorphone (PF) injection 1 mg (1 mg Intravenous Given 9/7/22 0008)   acetaminophen suppository 975 mg (975 mg Rectal Given 9/7/22 0041)   ketorolac injection 30 mg (30 mg Intravenous Given 9/7/22 0043)   lactated ringers bolus 1,000 mL (0 mLs Intravenous Stopped 9/7/22 0152)   magnesium sulfate 2g in water 50mL IVPB (premix) (2 g Intravenous New Bag 9/7/22 0128)   iopamidoL (ISOVUE-370) injection 100 mL (100 mLs Intravenous Given 9/7/22 0251)   piperacillin-tazobactam (ZOSYN) 4.5 g in dextrose 5 % in water (D5W) 5 % 100 mL IVPB (MB+) (4.5 g Intravenous New Bag 9/7/22 0332)   lactated ringers infusion (1,000 mLs Intravenous New Bag 9/7/22 0347)     Medical Decision Making:   Differential Diagnosis:   Nephrolithiasis, intra-abdominal infection, urinary retention, drug abuse, uti, pyelonephritis           ED Course as of 09/07/22 0408   Wed Sep 07, 2022   0026 Patient went to SVT in the 220s.  We tried multiple vagal maneuvers that was unsuccessful.  We were going to give patient's medicine and has been replacing the pads and rolled him over he converted into sinus tach in the 120s. [BS]   0127 Patient's heart rate went into 20s once again.  We were able to do vagal maneuvers with leg raises and he converted back to normal sinus rhythm with mild tachycardia.  Magnesium is low so we are replacing that. [BS]   0204 Fentanyl, Urine(!): Positive [BS]   0204 Cannabinoids, Urine(!): Positive [BS]   0204 Amphetamine Screen, Ur(!): Positive  Ct abd/pelvis w/o did not show any abnormalities. Pt remains febrile despite tylenol and Toradol. Does have significant hx of IVDU. No murmur but high concern of bacteremia. Will  obtain ct chest abd and pelvis with contrast.   [BS]   0228 HIV was ordered in light of drug use and leukopenia [BS]   0229 Magnesium(!): 1.30  2 g of Mag was given.  This will help in light of SVT [BS]   0345 After informed patient that his gallbladder looked infected on CT he states that yes he is having some right upper quadrant abdominal pain.  States that the pain started it is back now and is in the right upper quadrant.  He does have significant tenderness with positive Mallory sign.  Will continue the Zosyn.  Discontinuing the vanc. [BS]   0351 Spoke with surgery on-call, Dr. Loyola, he agrees with IV antibiotics.  Keep patient NPO.  Admit to hospitalist and consult him consult. [BS]   0358 Hospitalist agrees with admission [BS]   0406 Patient extremely upset he is unable to get any water.  We informed him that he is going for surgery this morning so he cannot drink anything.  We offered him some glycerin swabs to moisten his mouth and he states he does not want that.  Patient states that he does not want to stay in the hospital any longer wants to go home.  We informed him that he has a series life-threatening infection and he understands this.  He states that since we do not give him any water we do not care about his health.  Patient states that if he leaves he knows he can die but states he wants water.  He is in his right state of mind and I am unable to keep him against his will.  Patient signed out AMA.  He understands the risk of leaving. [BS]   0407 .aam [BS]   0407 This patient is choosing to leave against medical advice. I have personally explained to patient the risks and that choosing to do so may result in permanent bodily harm or even death. Discussed at great length that without further evaluation and monitoring there may have unforeseen circumstances and/or deterioration causing permanent bodily harm or death as a result of leaving against medical advice. Patient verbalizes these risks back  to me in layman terms. Patient still desires to leave against medical advice. Patient is alert, oriented, and shows the mental capacity to make clear decisions regarding his health care at this time. In light of patients´ decision to leave against medical advice, follow up will be arranged and patient is aware of the importance of following up as instructed. Advise patient to return to ED at any time immediately if he changes mind at any time or if condition begins to worsen or change in anyway.             [BS]      ED Course User Index  [BS] Gerry Nixon MD             Clinical Impression:   Final diagnoses:  [R00.0] Tachycardia  [R10.9] Flank pain  [I47.1] SVT (supraventricular tachycardia)  [F19.90] IVDU (intravenous drug user)  [A41.9] Sepsis without acute organ dysfunction, due to unspecified organism  [K81.9] Cholecystitis (Primary)      ED Disposition Condition    AMA                 Gerry Nixon MD  09/07/22 0401       Gerry Nixon MD  09/07/22 0408       Gerry Nixon MD  10/05/22 0030

## 2022-09-07 NOTE — H&P
[facility]  [facility]  History & Physical    Patient Name: Skyler Marin  MRN: [id number]  Patient Class: IP- Inpatient  Admission Date: 9/7/2022  Attending Physician: Zoran Jones MD   Primary Care Provider: Primary Doctor No         Patient information was obtained from patient and ER records.     Subjective:     Principal Problem:Calculus of gallbladder with acute cholecystitis without obstruction    Chief Complaint:   Chief Complaint   Patient presents with    Flank Pain     Pt c/o flank pain to lydia sides.        HPI: 32 yo male presents to the ED c/o 1.5 hours of B/L flank pain.  He told the ED physician he thought his kidneys were failing him and that he may have kidney stones.  His WBC was stable and he was febrile on admit.  He did state some nausea.  Pain is rated at 8/10 with no apparent relief.  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  CT scan did not show any stones but did show possible acute cholecystitis.  No chest pain/SOB.  No chills.  No V/D/C.  He did leave AMA from the ED because he wanted to drink water.  He apparently went into the waiting room and drank a lot of water and his pain returned so he checked back in the ED.  Surgery has been consulted.  A HIDA scan has been ordered.  At this time the patient seems to be sedated and thus I am not able to get much information from the patient.  He was also in SVT in the ED which converted.      History reviewed. No pertinent past medical history. Patient is too lethargic to get an accurate ROS    History reviewed. No pertinent surgical history.    Review of patient's allergies indicates:  No Known Allergies    Current Facility-Administered Medications on File Prior to Encounter   Medication    [COMPLETED] acetaminophen suppository 975 mg    [COMPLETED] HYDROmorphone (PF) injection 1 mg    [COMPLETED] iopamidoL (ISOVUE-370) injection 100 mL    [COMPLETED] ketorolac injection 30 mg     [COMPLETED] lactated ringers bolus 1,000 mL    [COMPLETED] lactated ringers bolus 1,000 mL    [COMPLETED] lactated ringers infusion    [COMPLETED] magnesium sulfate 2g in water 50mL IVPB (premix)    [COMPLETED] piperacillin-tazobactam (ZOSYN) 4.5 g in dextrose 5 % in water (D5W) 5 % 100 mL IVPB (MB+)    [DISCONTINUED] HYDROmorphone (PF) injection 0.5 mg    [DISCONTINUED] melatonin tablet 6 mg    [DISCONTINUED] metronidazole IVPB 500 mg    [DISCONTINUED] ondansetron injection 4 mg    [DISCONTINUED] sodium chloride 0.9% flush 10 mL    [DISCONTINUED] vancomycin (VANCOCIN) 1,500 mg in dextrose 5 % 250 mL IVPB     No current outpatient medications on file prior to encounter.     Family History    Patient lethargic       Tobacco Use    Smoking status: Not on file    Smokeless tobacco: Not on file   Substance and Sexual Activity    Alcohol use: Not on file    Drug use: Yes     Types: IV, Marijuana, Methamphetamines     Comment: heroin    Sexual activity: Not on file     Review of Systems   Unable to perform ROS: Mental status change   Objective:     Vital Signs (Most Recent):  Temp: 98.4 °F (36.9 °C) (09/07/22 1116)  Pulse: 96 (09/07/22 1116)  Resp: 20 (09/07/22 0645)  BP: 122/78 (09/07/22 1116)  SpO2: 99 % (09/07/22 1116) Vital Signs (24h Range):  Temp:  [97.6 °F (36.4 °C)-103 °F (39.4 °C)] 98.4 °F (36.9 °C)  Pulse:  [] 96  Resp:  [7-33] 20  SpO2:  [93 %-100 %] 99 %  BP: (110-157)/(60-89) 122/78     Weight: 85.3 kg (188 lb)  Body mass index is 26.98 kg/m².    Physical Exam  Constitutional:       Appearance: Normal appearance. He is normal weight.      Interventions: He is sedated.   HENT:      Head: Normocephalic and atraumatic.      Nose: Nose normal.      Mouth/Throat:      Mouth: Mucous membranes are dry.      Pharynx: Oropharynx is clear.   Eyes:      Extraocular Movements: Extraocular movements intact and EOM normal.      Conjunctiva/sclera: Conjunctivae normal.      Pupils: Pupils are equal,  round, and reactive to light.   Cardiovascular:      Rate and Rhythm: Normal rate and regular rhythm.      Pulses: Normal pulses.      Heart sounds: Normal heart sounds.   Pulmonary:      Effort: Pulmonary effort is normal.      Breath sounds: Normal breath sounds.   Abdominal:      General: Bowel sounds are normal.      Palpations: Abdomen is soft.      Tenderness: There is abdominal tenderness.   Musculoskeletal:         General: Normal range of motion.      Cervical back: Normal range of motion and neck supple.   Skin:     General: Skin is warm and dry.      Capillary Refill: Capillary refill takes 2 to 3 seconds.   Neurological:      General: No focal deficit present.      Mental Status: Mental status is at baseline. He is lethargic and disoriented.   Psychiatric:         Mood and Affect: Affect is labile.         Cognition and Memory: Cognition is impaired.         CRANIAL NERVES     CN I  cranial nerve I not tested    CN II   Visual fields full to confrontation.     CN III, IV, VI   Pupils are equal, round, and reactive to light.  Extraocular motions are normal.   CN III: no CN III palsy  CN VI: no CN VI palsy    CN V   Facial sensation intact.     CN VII   Facial expression full, symmetric.     CN VIII   CN VIII normal.     CN IX, X   CN IX normal.   CN X normal.     CN XI   CN XI normal.     CN XII   CN XII normal.      Significant Labs: All pertinent labs within the past 24 hours have been reviewed.  BMP:   Recent Labs   Lab 09/07/22  0038 09/07/22  0543   NA  --  140   K  --  4.4   CO2  --  24   BUN  --  9.0   CREATININE  --  1.04   CALCIUM  --  9.4   MG 1.30*  --      CBC:   Recent Labs   Lab 09/07/22  0012 09/07/22  0543   WBC 2.3* 9.4   HGB 11.9* 12.6*   HCT 36.5* 38.7*    132     CMP:   Recent Labs   Lab 09/07/22  0012 09/07/22  0543    140   K 3.6 4.4   CO2 23 24   BUN 8.0* 9.0   CREATININE 0.96 1.04   CALCIUM 9.0 9.4   ALBUMIN 3.2* 3.2*   BILITOT 0.7 1.1   ALKPHOS 103 105   AST 15 59*    ALT 10 34     Magnesium:   Recent Labs   Lab 09/07/22  0038   MG 1.30*       Significant Imaging: I have reviewed all pertinent imaging results/findings within the past 24 hours.    Assessment/Plan:     * Calculus of gallbladder with acute cholecystitis without obstruction  Admitted to inpatient  Surgery consulted  HIDA scan ordered  Pain control  Follow labs  OOB  DVT prophylaxis  Cultures pending  IV antibiotics      Polysubstance abuse  advised to stop      Hypomagnesemia  Follow labs  4 g IV Mag now        VTE Risk Mitigation (From admission, onward)         Ordered     Place sequential compression device  Until discontinued         09/07/22 0533     IP VTE LOW RISK PATIENT  Once         09/07/22 0533                   Skyler Christine MD  Department of Hospital Medicine   [facility]

## 2022-09-07 NOTE — ED PROVIDER NOTES
Encounter Date: 9/7/2022       History     Chief Complaint   Patient presents with    Flank Pain     Pt c/o flank pain to lydia sides.     HPI    31-year-old male returns emergency department after signing out AMA.  Patient was originally admitted for cholecystitis although left after he found out that he was NPO and was not able to drink any fluids.  Patient was offered glycerin swabs which she refused.  He signed out AMA to go drink water because he states that was more important than his infection and states he did not want any more treatment from this hospital.  Patient was in the right state of mind when he signed out AMA.  He went to rule waiting room drink plenty of water that started having worsening right upper quadrant pain.  Patient has check back into the emergency department.    Review of patient's allergies indicates:  No Known Allergies  History reviewed. No pertinent past medical history.  History reviewed. No pertinent surgical history.  History reviewed. No pertinent family history.     Review of Systems   Constitutional:  Positive for fever.   Respiratory:  Negative for cough and shortness of breath.    Cardiovascular:  Negative for chest pain.   Gastrointestinal:  Positive for abdominal pain, nausea and vomiting.   All other systems reviewed and are negative.    Physical Exam     Initial Vitals [09/07/22 0523]   BP Pulse Resp Temp SpO2   130/71 98 20 98.6 °F (37 °C) 99 %      MAP       --         Physical Exam    Nursing note and vitals reviewed.  Constitutional: He appears well-developed and well-nourished. No distress.   Cardiovascular:  Normal rate and regular rhythm.           Pulmonary/Chest: Breath sounds normal. No respiratory distress.   Abdominal: Abdomen is soft. Bowel sounds are normal. There is abdominal tenderness in the right upper quadrant. There is rebound, guarding and positive Mallory's sign.   Musculoskeletal:         General: No tenderness. Normal range of motion.      Neurological: He is alert and oriented to person, place, and time.   Skin: Skin is warm. Capillary refill takes less than 2 seconds.   Psychiatric: He has a normal mood and affect. Thought content normal.       ED Course   Procedures  Labs Reviewed   CBC W/ AUTO DIFFERENTIAL    Narrative:     The following orders were created for panel order CBC auto differential.  Procedure                               Abnormality         Status                     ---------                               -----------         ------                     CBC with Differential[[id number]]                                                         Please view results for these tests on the individual orders.   COMPREHENSIVE METABOLIC PANEL   CBC WITH DIFFERENTIAL          Imaging Results    None          Medications   metronidazole IVPB 500 mg (has no administration in time range)   piperacillin-tazobactam (ZOSYN) 4.5 g in dextrose 5 % in water (D5W) 5 % 100 mL IVPB (MB+) (has no administration in time range)   sodium chloride 0.9% flush 10 mL (has no administration in time range)   HYDROmorphone (PF) injection 0.5 mg (has no administration in time range)   ondansetron injection 4 mg (has no administration in time range)     Medical Decision Making:   Differential Diagnosis:   Cholecystitis, bacteremia, drug abuse                  Admission on 09/06/2022, Discharged on 09/07/2022   Component Date Value Ref Range Status    Sodium Level 09/07/2022 143  136 - 145 mmol/L Final    Potassium Level 09/07/2022 3.6  3.5 - 5.1 mmol/L Final    Chloride 09/07/2022 107  98 - 107 mmol/L Final    Carbon Dioxide 09/07/2022 23  22 - 29 mmol/L Final    Glucose Level 09/07/2022 96  74 - 100 mg/dL Final    Blood Urea Nitrogen 09/07/2022 8.0 (L)  8.9 - 20.6 mg/dL Final    Creatinine 09/07/2022 0.96  0.73 - 1.18 mg/dL Final    Calcium Level Total 09/07/2022 9.0  8.4 - 10.2 mg/dL Final    Protein Total 09/07/2022 6.3 (L)  6.4 - 8.3 gm/dL Final    Albumin  Level 09/07/2022 3.2 (L)  3.5 - 5.0 gm/dL Final    Globulin 09/07/2022 3.1  2.4 - 3.5 gm/dL Final    Albumin/Globulin Ratio 09/07/2022 1.0 (L)  1.1 - 2.0 ratio Final    Bilirubin Total 09/07/2022 0.7  <=1.5 mg/dL Final    Alkaline Phosphatase 09/07/2022 103  40 - 150 unit/L Final    Alanine Aminotransferase 09/07/2022 10  0 - 55 unit/L Final    Aspartate Aminotransferase 09/07/2022 15  5 - 34 unit/L Final    eGFR 09/07/2022 >60  mls/min/1.73/m2 Final    Color, UA 09/07/2022 Yellow  Yellow, Colorless, Other, Clear Final    Appearance, UA 09/07/2022 Clear  Clear Final    Specific Gravity, UA 09/07/2022 1.015   Final    pH, UA 09/07/2022 7.0  5.0, 5.5, 6.0, 6.5, 7.0, 7.5, 8.0, 8.5 Final    Protein, UA 09/07/2022 Negative  Negative, 300  mg/dL Final    Glucose, UA 09/07/2022 Negative  Negative, Normal mg/dL Final    Ketones, UA 09/07/2022 Negative  Negative, +1, +2, +3, +4, +5, >=160, >=80 mg/dL Final    Blood, UA 09/07/2022 Trace-Intact (A)  Negative unit/L Final    Bilirubin, UA 09/07/2022 Negative  Negative mg/dL Final    Urobilinogen, UA 09/07/2022 0.2  0.2, 1.0, Normal mg/dL Final    Nitrites, UA 09/07/2022 Negative  Negative Final    Leukocyte Esterase, UA 09/07/2022 Negative  Negative, 75  unit/L Final    Amphetamines, Urine 09/07/2022 Positive (A)  Negative Final    Barbituates, Urine 09/07/2022 Negative  Negative Final    Benzodiazepine, Urine 09/07/2022 Negative  Negative Final    Cannabinoids, Urine 09/07/2022 Positive (A)  Negative Final    Cocaine, Urine 09/07/2022 Negative  Negative Final    Fentanyl, Urine 09/07/2022 Positive (A)  Negative Final    MDMA, Urine 09/07/2022 Negative  Negative Final    Opiates, Urine 09/07/2022 Negative  Negative Final    Phencyclidine, Urine 09/07/2022 Negative  Negative Final    pH, Urine 09/07/2022 7.0  3.0 - 11.0 Final    Specific Gravity, Urine Auto 09/07/2022 1.015  1.001 - 1.035 Final    WBC 09/07/2022 2.3 (L)  4.5 - 11.5 x10(3)/mcL Final    RBC 09/07/2022 4.05 (L)   4.70 - 6.10 x10(6)/mcL Final    Hgb 09/07/2022 11.9 (L)  14.0 - 18.0 gm/dL Final    Hct 09/07/2022 36.5 (L)  42.0 - 52.0 % Final    MCV 09/07/2022 90.1  80.0 - 94.0 fL Final    MCH 09/07/2022 29.4  27.0 - 31.0 pg Final    MCHC 09/07/2022 32.6 (L)  33.0 - 36.0 mg/dL Final    RDW 09/07/2022 12.4  11.5 - 17.0 % Final    Platelet 09/07/2022 144  130 - 400 x10(3)/mcL Final    MPV 09/07/2022 8.1  7.4 - 10.4 fL Final    IG# 09/07/2022 0.02  0 - 0.04 x10(3)/mcL Final    IG% 09/07/2022 0.9  % Final    Neut Man 09/07/2022 77  47 - 80 % Final    Band Neutrophil Man 09/07/2022 1  0 - 11 % Final    Lymph Man 09/07/2022 15  13 - 40 % Final    Monocyte Man 09/07/2022 5  2 - 11 % Final    Eos Man 09/07/2022 2  0 - 8 % Final    Abs Neut calc 09/07/2022 1.794 (L)  2.1 - 9.2 x10(3)/mcL Final    Abs Mono 09/07/2022 0.115  0.1 - 1.3 x10(3)/mcL Final    Abs Lymp 09/07/2022 0.345 (L)  0.6 - 4.6 x10(3)/mcL Final    Abs Eos  09/07/2022 0.046  0 - 0.9 x10(3)/mcL Final    RBC Morph 09/07/2022 Normal  Normal Final    Platelet Est 09/07/2022 Adequate  Normal, Adequate Final    Magnesium Level 09/07/2022 1.30 (L)  1.60 - 2.60 mg/dL Final    Lactic Acid Level 09/07/2022 3.6 (HH)  0.5 - 2.2 mmol/L Final    PTT 09/07/2022 30.8  23.2 - 33.7 seconds Final    Ethanol Level 09/07/2022 <10.0  <=10.0 mg/dL Final    Troponin-I 09/07/2022 <0.010  0.000 - 0.045 ng/mL Final    Thyroid Stimulating Hormone 09/07/2022 0.4468  0.3500 - 4.9400 uIU/mL Final    Lipase Level 09/07/2022 13  <=60 U/L Final    PT 09/07/2022 15.5 (H)  12.5 - 14.5 seconds Final    INR 09/07/2022 1.25  0.00 - 1.30 Final    Influenza A PCR 09/07/2022 Not Detected  Not Detected Final    Influenza B PCR 09/07/2022 Not Detected  Not Detected Final    SARS-CoV-2 PCR 09/07/2022 Not Detected  Not Detected Final    Bacteria, UA 09/07/2022 None Seen  None Seen, Rare, Occasional /HPF Final    Sperm, UA 09/07/2022 Rare (A)  None Seen /HPF Final    RBC, UA 09/07/2022 0-2  None Seen, 0-2, 3-5, 0-5  /HPF Final    WBC, UA 09/07/2022 None Seen  None Seen, 0-2, 3-5, 0-5 /HPF Final    Squamous Epithelial Cells, UA 09/07/2022 Rare  None Seen, Rare, Occasional, Occ /HPF Final    Lactic Acid Level 09/07/2022 3.0 (H)  0.5 - 2.2 mmol/L Final     Narrative & Impression  START OF REPORT:  Technique: CT Scan of the chest abdomen and pelvis was performed with intravenous contrast with axial as well as sagittal and, coronal images.     Dosage Information: Automated Exposure Control was utilized.     Comparison: Comparison is with study [id number] 01:13:58.     Clinical History: Sepsis, chest, abdomen/pelvis with contrast.     Findings:  Artifact: Mild motion artifact is seen on some images.  Soft Tissues: Unremarkable.  Lines and Tubes: None.  Axilla: A few prominent lymph nodes are seen in the left greater than right axilla.  Neck: The visualized soft tissues of the neck appear unremarkable. The thyroid gland appear unremarkable.  Mediastinum: The mediastinal structures are within normal limits.  Heart: The heart appears unremarkable.  Aorta: Unremarkable appearing aorta.  Pulmonary Arteries: Unremarkable. No filling defects are seen in the pulmonary arteries to suggest pulmonary embolus.  Lungs: The lungs are clear with no focal infiltrate or airspace disease. There is mild non specific dependent change at the lung bases.  Pleura: No effusions or pneumothorax are identified.  Bony Structures:  Spine: Mild spondylolytic changes of the thoracic spine. Chronic appearing anterior wedge compression of the T9 -T11 thoracic vertebrae is seen.  Ribs: No rib fractures are identified.  Abdomen:  Abdominal Wall: No abdominal wall pathology is seen.  Liver: The liver appears unremarkable.  Biliary System: No intrahepatic or extrahepatic biliary duct dilatation is seen.  Gallbladder: There is mild gallbladder distension. Mild gall bladder wall enhancement is seen at the fundus, series 16, image 79. There is moderate fluid  surrounding the gall bladder in its fossa, Series 5, image 39. These findings are suggestive of acute cholecystitis. Correlate with clinical and laboratory findings as regards further evaluation and follow-up.  Pancreas: The pancreas appears unremarkable.  Spleen: The spleen appears unremarkable.  Adrenals: The adrenal glands appear unremarkable.  Kidneys: The kidneys appear unremarkable with no stones cysts masses or hydronephrosis.  Aorta: The abdominal aorta appears unremarkable.  IVC: Unremarkable.  Bowel:  Esophagus: The visualized esophagus appears unremarkable. Thickening versus underdistention of the distal esophagus. Correlate clinically as regards possible reflux esophagitis.  Stomach: The stomach appears unremarkable.  Duodenum: Unremarkable appearing duodenum.  Small Bowel: The small bowel appears unremarkable.  Colon: There is moderate stool in the colon which could reflect an element of constipation.  Appendix: The appendix appears unremarkable and is seen on. The appendicolith is seen again at the base of the appendiceal lumen without adjacent inflammatory changes to suggest appendicitis.  Peritoneum: No intraperitoneal free air or ascites is seen.     Pelvis:  Bladder: The bladder is nondistended however the bladder wall appears thickened after considering state of nondistension which could reflect an element of cystitis.  Male:  Prostate gland: The prostate gland appears unremarkable.     Bony structures:  Dorsal Spine: There is mild spondylosis of the visualized dorsal spine.  Bony Pelvis: The visualized bony structures of the pelvis appear unremarkable.        Impression:  1. Mild gall bladder wall enhancement is seen at the fundus, series 16, image 79. There is moderate fluid surrounding the gall bladder in its fossa, Series 5, image 39. These findings are suggestive of acute cholecystitis. Correlate with clinical and laboratory findings as regards further evaluation and follow-up.  2. The  bladder is nondistended however the bladder wall appears thickened after considering state of nondistension which could reflect an element of cystitis.  3. Details and other findings as discussed above.        This result has not been signed. Information might be incomplete.  Clinical Impression:   Final diagnoses:  [K81.9] Cholecystitis (Primary)  [A41.9] Sepsis, due to unspecified organism, unspecified whether acute organ dysfunction present  [F19.90] IVDU (intravenous drug user)  [I47.1] SVT (supraventricular tachycardia)        ED Disposition Condition    Admit                 Gerry Nixon MD  09/07/22 0537

## 2022-09-07 NOTE — HPI
30 yo male presents to the ED c/o 1.5 hours of B/L flank pain.  He told the ED physician he thought his kidneys were failing him and that he may have kidney stones.  His WBC was stable and he was febrile on admit.  He did state some nausea.  Pain is rated at 8/10 with no apparent relief.  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  CT scan did not show any stones but did show possible acute cholecystitis.  No chest pain/SOB.  No chills.  No V/D/C.  He did leave AMA from the ED because he wanted to drink water.  He apparently went into the waiting room and drank a lot of water and his pain returned so he checked back in the ED.  Surgery has been consulted.  A HIDA scan has been ordered.  At this time the patient seems to be sedated and thus I am not able to get much information from the patient.  He was also in SVT in the ED which converted.

## 2022-09-07 NOTE — CLINICAL REVIEW
31-year-old male admitted on 09/07/2022 with acute cholecystitis.  The patient left against medical advice because he wanted to drink water.  However, upon ingesting water, his abdominal pain resumed in so he checked right back in.  Currently, hemodynamically stable, afebrile.  Labs showed hypomagnesemia, however, otherwise unremarkable.  HIDA scan pending for possible cholecystitis affirmation.  At the present time, no evidence of hemodynamic instability, active comorbidities.  Potential cholecystectomy does not require inpatient setting unless there is evidence of ascending cholangitis, biliary sepsis.  At the present time, the patient is appropriate for observation LOC     Kb Dobson MD  Utilization Management  Physician Advisor

## 2022-09-07 NOTE — NURSING
WHILE ASSISTING PATIENT IN ROOM 311, VIET (ROOM 310) WAS HEARD SHOUTING. IMMEDIATELY LEFT ROOM 311 TO CHECK ON PATIENT. PATIENT CONTINUED YELLING. HE WAS ANXIOUS AND NONVIOLENT, STATING THAT HE NEEDED TO GET OUT  OF THIS PLACE. I EDUCATED PATIENT ON SEVERITY OF HIS CONDITION AND PLANS FOR TOMORROW. PATIENT WAS RECEPTIVE BUT STILL CHOSE TO LEAVE AMA.

## 2022-09-07 NOTE — ASSESSMENT & PLAN NOTE
Admitted to inpatient  Surgery consulted  HIDA scan ordered  Pain control  Follow labs  OOB  DVT prophylaxis  Cultures pending  IV antibiotics

## 2022-09-07 NOTE — SUBJECTIVE & OBJECTIVE
History reviewed. No pertinent past medical history.    History reviewed. No pertinent surgical history.    Review of patient's allergies indicates:  No Known Allergies    Current Facility-Administered Medications on File Prior to Encounter   Medication    [COMPLETED] acetaminophen suppository 975 mg    [COMPLETED] HYDROmorphone (PF) injection 1 mg    [COMPLETED] iopamidoL (ISOVUE-370) injection 100 mL    [COMPLETED] ketorolac injection 30 mg    [COMPLETED] lactated ringers bolus 1,000 mL    [COMPLETED] lactated ringers bolus 1,000 mL    [COMPLETED] lactated ringers infusion    [COMPLETED] magnesium sulfate 2g in water 50mL IVPB (premix)    [COMPLETED] piperacillin-tazobactam (ZOSYN) 4.5 g in dextrose 5 % in water (D5W) 5 % 100 mL IVPB (MB+)    [DISCONTINUED] HYDROmorphone (PF) injection 0.5 mg    [DISCONTINUED] melatonin tablet 6 mg    [DISCONTINUED] metronidazole IVPB 500 mg    [DISCONTINUED] ondansetron injection 4 mg    [DISCONTINUED] sodium chloride 0.9% flush 10 mL    [DISCONTINUED] vancomycin (VANCOCIN) 1,500 mg in dextrose 5 % 250 mL IVPB     No current outpatient medications on file prior to encounter.     Family History    None       Tobacco Use    Smoking status: Not on file    Smokeless tobacco: Not on file   Substance and Sexual Activity    Alcohol use: Not on file    Drug use: Yes     Types: IV, Marijuana, Methamphetamines     Comment: heroin    Sexual activity: Not on file     Review of Systems   Unable to perform ROS: Mental status change   Objective:     Vital Signs (Most Recent):  Temp: 98.4 °F (36.9 °C) (09/07/22 1116)  Pulse: 96 (09/07/22 1116)  Resp: 20 (09/07/22 0645)  BP: 122/78 (09/07/22 1116)  SpO2: 99 % (09/07/22 1116) Vital Signs (24h Range):  Temp:  [97.6 °F (36.4 °C)-103 °F (39.4 °C)] 98.4 °F (36.9 °C)  Pulse:  [] 96  Resp:  [7-33] 20  SpO2:  [93 %-100 %] 99 %  BP: (110-157)/(60-89) 122/78     Weight: 85.3 kg (188 lb)  Body mass index is 26.98 kg/m².    Physical  Exam  Constitutional:       Appearance: Normal appearance. He is normal weight.      Interventions: He is sedated.   HENT:      Head: Normocephalic and atraumatic.      Nose: Nose normal.      Mouth/Throat:      Mouth: Mucous membranes are dry.      Pharynx: Oropharynx is clear.   Eyes:      Extraocular Movements: Extraocular movements intact and EOM normal.      Conjunctiva/sclera: Conjunctivae normal.      Pupils: Pupils are equal, round, and reactive to light.   Cardiovascular:      Rate and Rhythm: Normal rate and regular rhythm.      Pulses: Normal pulses.      Heart sounds: Normal heart sounds.   Pulmonary:      Effort: Pulmonary effort is normal.      Breath sounds: Normal breath sounds.   Abdominal:      General: Bowel sounds are normal.      Palpations: Abdomen is soft.      Tenderness: There is abdominal tenderness.   Musculoskeletal:         General: Normal range of motion.      Cervical back: Normal range of motion and neck supple.   Skin:     General: Skin is warm and dry.      Capillary Refill: Capillary refill takes 2 to 3 seconds.   Neurological:      General: No focal deficit present.      Mental Status: Mental status is at baseline. He is lethargic and disoriented.   Psychiatric:         Mood and Affect: Affect is labile.         Cognition and Memory: Cognition is impaired.         CRANIAL NERVES     CN I  cranial nerve I not tested    CN II   Visual fields full to confrontation.     CN III, IV, VI   Pupils are equal, round, and reactive to light.  Extraocular motions are normal.   CN III: no CN III palsy  CN VI: no CN VI palsy    CN V   Facial sensation intact.     CN VII   Facial expression full, symmetric.     CN VIII   CN VIII normal.     CN IX, X   CN IX normal.   CN X normal.     CN XI   CN XI normal.     CN XII   CN XII normal.      Significant Labs: All pertinent labs within the past 24 hours have been reviewed.  BMP:   Recent Labs   Lab 09/07/22  0038 09/07/22  0543   NA  --  140   K  --   4.4   CO2  --  24   BUN  --  9.0   CREATININE  --  1.04   CALCIUM  --  9.4   MG 1.30*  --      CBC:   Recent Labs   Lab 09/07/22 0012 09/07/22  0543   WBC 2.3* 9.4   HGB 11.9* 12.6*   HCT 36.5* 38.7*    132     CMP:   Recent Labs   Lab 09/07/22 0012 09/07/22  0543    140   K 3.6 4.4   CO2 23 24   BUN 8.0* 9.0   CREATININE 0.96 1.04   CALCIUM 9.0 9.4   ALBUMIN 3.2* 3.2*   BILITOT 0.7 1.1   ALKPHOS 103 105   AST 15 59*   ALT 10 34     Magnesium:   Recent Labs   Lab 09/07/22  0038   MG 1.30*       Significant Imaging: I have reviewed all pertinent imaging results/findings within the past 24 hours.

## 2022-09-12 LAB
BACTERIA BLD CULT: NORMAL
BACTERIA BLD CULT: NORMAL

## 2022-09-14 NOTE — DISCHARGE SUMMARY
[facility]  Medical Surgical Unit  Hospital Medicine  Discharge Summary      Patient Name: Skyler Marin  MRN: [id number]  Patient Class: OP- Observation  Admission Date: 9/7/2022  Hospital Length of Stay: 1 days  Discharge Date and Time:  09/14/2022 2:22 PM  Attending Physician: No att. providers found   Discharging Provider: Skyler Christine MD  Primary Care Provider: Primary Doctor No      HPI:   32 yo male presents to the ED c/o 1.5 hours of B/L flank pain.  He told the ED physician he thought his kidneys were failing him and that he may have kidney stones.  His WBC was stable and he was febrile on admit.  He did state some nausea.  Pain is rated at 8/10 with no apparent relief.  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  CT scan did not show any stones but did show possible acute cholecystitis.  No chest pain/SOB.  No chills.  No V/D/C.  He did leave AMA from the ED because he wanted to drink water.  He apparently went into the waiting room and drank a lot of water and his pain returned so he checked back in the ED.  Surgery has been consulted.  A HIDA scan has been ordered.  At this time the patient seems to be sedated and thus I am not able to get much information from the patient.  He was also in SVT in the ED which converted.      * No surgery found *      Hospital Course:   Patient left AMA       Goals of Care Treatment Preferences:  Code Status: Full Code      Consults:     No new Assessment & Plan notes have been filed under this hospital service since the last note was generated.  Service: Hospital Medicine    Final Active Diagnoses:    Diagnosis Date Noted POA    PRINCIPAL PROBLEM:  Calculus of gallbladder with acute cholecystitis without obstruction [K80.00] 09/07/2022 Yes    Hypomagnesemia [E83.42] 09/07/2022 Yes    Polysubstance abuse [F19.10] 09/07/2022 Unknown      Problems Resolved During this Admission:       Discharged Condition: against medical advice    Disposition:  Left Against Medical Adv*    Follow Up:    Patient Instructions:   No discharge procedures on file.    Significant Diagnostic Studies: Labs: BMP: No results for input(s): GLU, NA, K, CL, CO2, BUN, CREATININE, CALCIUM, MG in the last 48 hours., CMP No results for input(s): NA, K, CL, CO2, GLU, BUN, CREATININE, CALCIUM, PROT, ALBUMIN, BILITOT, ALKPHOS, AST, ALT, ANIONGAP, ESTGFRAFRICA, EGFRNONAA in the last 48 hours. and CBC No results for input(s): WBC, HGB, HCT, PLT in the last 48 hours.    Pending Diagnostic Studies:     None         Medications:  Reconciled Home Medications:      Medication List      You have not been prescribed any medications.         Indwelling Lines/Drains at time of discharge:   Lines/Drains/Airways     None                 Time spent on the discharge of patient: 20 minutes         Skyler Christine MD  Department of Hospital Medicine  [facility] - Medical Surgical Unit

## 2024-07-09 ENCOUNTER — HOSPITAL ENCOUNTER (EMERGENCY)
Facility: HOSPITAL | Age: 34
Discharge: HOME | End: 2024-07-09
Attending: INTERNAL MEDICINE
Payer: MEDICAID

## 2024-07-09 VITALS
SYSTOLIC BLOOD PRESSURE: 132 MMHG | DIASTOLIC BLOOD PRESSURE: 81 MMHG | BODY MASS INDEX: 33.82 KG/M2 | TEMPERATURE: 98 F | RESPIRATION RATE: 20 BRPM | OXYGEN SATURATION: 98 % | HEIGHT: 67 IN | WEIGHT: 215.5 LBS | HEART RATE: 74 BPM

## 2024-07-09 DIAGNOSIS — R11.2 NAUSEA AND VOMITING, UNSPECIFIED VOMITING TYPE: Primary | ICD-10-CM

## 2024-07-09 LAB
ALBUMIN SERPL-MCNC: 3.4 G/DL (ref 3.5–5)
ALBUMIN/GLOB SERPL: 1 RATIO (ref 1.1–2)
ALP SERPL-CCNC: 102 UNIT/L (ref 40–150)
ALT SERPL-CCNC: 14 UNIT/L (ref 0–55)
ANION GAP SERPL CALC-SCNC: 8 MEQ/L
AST SERPL-CCNC: 15 UNIT/L (ref 5–34)
BASOPHILS # BLD AUTO: 0.03 X10(3)/MCL
BASOPHILS NFR BLD AUTO: 0.5 %
BILIRUB SERPL-MCNC: 0.3 MG/DL
BUN SERPL-MCNC: 6.5 MG/DL (ref 8.9–20.6)
CALCIUM SERPL-MCNC: 9.4 MG/DL (ref 8.4–10.2)
CHLORIDE SERPL-SCNC: 106 MMOL/L (ref 98–107)
CO2 SERPL-SCNC: 26 MMOL/L (ref 22–29)
CREAT SERPL-MCNC: 0.69 MG/DL (ref 0.73–1.18)
CREAT/UREA NIT SERPL: 9
EOSINOPHIL # BLD AUTO: 0.11 X10(3)/MCL (ref 0–0.9)
EOSINOPHIL NFR BLD AUTO: 1.8 %
ERYTHROCYTE [DISTWIDTH] IN BLOOD BY AUTOMATED COUNT: 13.3 % (ref 11.5–17)
GFR SERPLBLD CREATININE-BSD FMLA CKD-EPI: >60 ML/MIN/1.73/M2
GLOBULIN SER-MCNC: 3.4 GM/DL (ref 2.4–3.5)
GLUCOSE SERPL-MCNC: 105 MG/DL (ref 74–100)
HCT VFR BLD AUTO: 36.7 % (ref 42–52)
HGB BLD-MCNC: 12.3 G/DL (ref 14–18)
IMM GRANULOCYTES # BLD AUTO: 0.01 X10(3)/MCL (ref 0–0.04)
IMM GRANULOCYTES NFR BLD AUTO: 0.2 %
LIPASE SERPL-CCNC: 13 U/L
LYMPHOCYTES # BLD AUTO: 1.45 X10(3)/MCL (ref 0.6–4.6)
LYMPHOCYTES NFR BLD AUTO: 23.1 %
MCH RBC QN AUTO: 27.6 PG (ref 27–31)
MCHC RBC AUTO-ENTMCNC: 33.5 G/DL (ref 33–36)
MCV RBC AUTO: 82.3 FL (ref 80–94)
MONOCYTES # BLD AUTO: 0.34 X10(3)/MCL (ref 0.1–1.3)
MONOCYTES NFR BLD AUTO: 5.4 %
NEUTROPHILS # BLD AUTO: 4.33 X10(3)/MCL (ref 2.1–9.2)
NEUTROPHILS NFR BLD AUTO: 69 %
NRBC BLD AUTO-RTO: 0 %
PLATELET # BLD AUTO: 308 X10(3)/MCL (ref 130–400)
PMV BLD AUTO: 9.1 FL (ref 7.4–10.4)
POTASSIUM SERPL-SCNC: 3.9 MMOL/L (ref 3.5–5.1)
PROT SERPL-MCNC: 6.8 GM/DL (ref 6.4–8.3)
RBC # BLD AUTO: 4.46 X10(6)/MCL (ref 4.7–6.1)
SODIUM SERPL-SCNC: 140 MMOL/L (ref 136–145)
WBC # BLD AUTO: 6.27 X10(3)/MCL (ref 4.5–11.5)

## 2024-07-09 PROCEDURE — 63600175 PHARM REV CODE 636 W HCPCS

## 2024-07-09 PROCEDURE — 96375 TX/PRO/DX INJ NEW DRUG ADDON: CPT

## 2024-07-09 PROCEDURE — 80053 COMPREHEN METABOLIC PANEL: CPT

## 2024-07-09 PROCEDURE — 25000003 PHARM REV CODE 250

## 2024-07-09 PROCEDURE — 83690 ASSAY OF LIPASE: CPT

## 2024-07-09 PROCEDURE — 96374 THER/PROPH/DIAG INJ IV PUSH: CPT

## 2024-07-09 PROCEDURE — 85025 COMPLETE CBC W/AUTO DIFF WBC: CPT

## 2024-07-09 PROCEDURE — 99284 EMERGENCY DEPT VISIT MOD MDM: CPT | Mod: 25

## 2024-07-09 RX ORDER — OMEPRAZOLE 20 MG/1
20 CAPSULE, DELAYED RELEASE ORAL DAILY
Qty: 30 CAPSULE | Refills: 2 | Status: SHIPPED | OUTPATIENT
Start: 2024-07-09

## 2024-07-09 RX ORDER — FAMOTIDINE 10 MG/ML
20 INJECTION INTRAVENOUS
Status: COMPLETED | OUTPATIENT
Start: 2024-07-09 | End: 2024-07-09

## 2024-07-09 RX ORDER — ONDANSETRON HYDROCHLORIDE 2 MG/ML
4 INJECTION, SOLUTION INTRAVENOUS
Status: COMPLETED | OUTPATIENT
Start: 2024-07-09 | End: 2024-07-09

## 2024-07-09 RX ORDER — ONDANSETRON 4 MG/1
4 TABLET, ORALLY DISINTEGRATING ORAL EVERY 8 HOURS PRN
Qty: 12 TABLET | Refills: 0 | Status: SHIPPED | OUTPATIENT
Start: 2024-07-09

## 2024-07-09 RX ADMIN — ONDANSETRON 4 MG: 2 INJECTION INTRAMUSCULAR; INTRAVENOUS at 01:07

## 2024-07-09 RX ADMIN — FAMOTIDINE 20 MG: 10 INJECTION, SOLUTION INTRAVENOUS at 01:07

## 2024-07-09 NOTE — ED PROVIDER NOTES
"Encounter Date: 7/9/2024       History     Chief Complaint   Patient presents with    Emesis     Reports dark red vomitus x2 days. Also reports nausea. Pale. States he thinks he was diagnosed with stomach ulcers a few years ago     Mr. Marin is a 33-year-old male presenting to the ED complaining of hematemesis x1 day and headache. Patient reports nausea and 3 successive episodes of hematemesis last night. Vomit was initially dark red but turned bright red by the third episode. Says it was "quite a lot" of blood in the vomit, no food or bile present. Has not vomited since then but is still nauseated. Headache continues to worsen which is what prompted his visit to the ED. No prior history of hematemesis but reports being diagnosed with PUD about 8 years ago. States endoscope performed then was unremarkable. Denies alcohol use and NSAID use, but reports smoking 0.5 pack/day as well as regular heroine and meth use. Denies fever, SOB, CP, abdominal pain, blood in stool.     The history is provided by the patient. No  was used.     Review of patient's allergies indicates:  No Known Allergies  Past Medical History:   Diagnosis Date    GERD (gastroesophageal reflux disease)      History reviewed. No pertinent surgical history.  Family History   Problem Relation Name Age of Onset    No Known Problems Mother      No Known Problems Father       Social History     Tobacco Use    Smoking status: Every Day     Current packs/day: 0.50     Average packs/day: 0.5 packs/day for 10.0 years (5.0 ttl pk-yrs)     Types: Cigarettes     Start date: 7/9/2014   Substance Use Topics    Alcohol use: Not Currently    Drug use: Yes     Types: IV, Marijuana, Methamphetamines     Comment: heroin     Review of Systems   Constitutional:  Negative for chills and fever.   HENT:  Negative for sore throat.    Respiratory:  Negative for cough and shortness of breath.    Cardiovascular:  Negative for chest pain.   Gastrointestinal:  " Positive for vomiting. Negative for abdominal distention, blood in stool, constipation, diarrhea and nausea.   Genitourinary:  Negative for dysuria.   Musculoskeletal:  Negative for back pain.   Skin:  Negative for rash.   Neurological:  Positive for headaches. Negative for weakness.   Hematological:  Does not bruise/bleed easily.       Physical Exam     Initial Vitals [07/09/24 0025]   BP Pulse Resp Temp SpO2   (!) 145/88 80 20 97.6 °F (36.4 °C) 97 %      MAP       --         Physical Exam    Nursing note and vitals reviewed.  Constitutional: He appears well-developed and well-nourished.   HENT:   Head: Normocephalic and atraumatic.   Mouth/Throat: Oropharynx is clear and moist.   Eyes: Conjunctivae and EOM are normal. Pupils are equal, round, and reactive to light.   Neck: Neck supple.   Normal range of motion.  Cardiovascular:  Normal rate, regular rhythm, normal heart sounds and intact distal pulses.           Pulmonary/Chest: Breath sounds normal.   Abdominal: Abdomen is soft. Bowel sounds are normal.   Musculoskeletal:         General: Normal range of motion.      Cervical back: Normal range of motion and neck supple.     Neurological: He is alert and oriented to person, place, and time.   Skin: Skin is warm and dry. There is pallor.   Psychiatric: He has a normal mood and affect. Thought content normal.         ED Course   Procedures  Labs Reviewed   COMPREHENSIVE METABOLIC PANEL - Abnormal; Notable for the following components:       Result Value    Glucose 105 (*)     Blood Urea Nitrogen 6.5 (*)     Creatinine 0.69 (*)     Albumin 3.4 (*)     Albumin/Globulin Ratio 1.0 (*)     All other components within normal limits   CBC WITH DIFFERENTIAL - Abnormal; Notable for the following components:    RBC 4.46 (*)     Hgb 12.3 (*)     Hct 36.7 (*)     All other components within normal limits   LIPASE - Normal   CBC W/ AUTO DIFFERENTIAL    Narrative:     The following orders were created for panel order CBC auto  differential.  Procedure                               Abnormality         Status                     ---------                               -----------         ------                     CBC with Differential[[id number]]        Abnormal            Final result                 Please view results for these tests on the individual orders.          Imaging Results    None          Medications   famotidine (PF) injection 20 mg (20 mg Intravenous Given 7/9/24 0117)   ondansetron injection 4 mg (4 mg Intravenous Given 7/9/24 0117)     Medical Decision Making  33-year-old male presenting to the ED complaining of hematemesis x1 day and headache. Patient reports nausea and 3 successive episodes of hematemesis last night. Vomit was initially dark red but turned bright red by the third episode. Has not vomited since then but is still nauseated. Headache continues to worsen which is what prompted his visit to the ED. Reports diagnosis of PUD 8 years ago. Not taking any medications. Denies fever, SOB, CP, abdominal pain, blood in stool. Reports smoking 0.5 ppd as well as frequent heroine and meth use.    Vitals stable, patient NAD and resting in bed. Pepcid 20 mg and Zofran 4 mg given. Passed PO challenge. Appropriate referrals placed to ambulatory internal medicine. Discharge with Zofran and PPI. Follow-up with PCP.         Amount and/or Complexity of Data Reviewed  Labs: ordered. Decision-making details documented in ED Course.    Risk  Prescription drug management.      Additional MDM:   Differential Diagnosis:   Other: The following diagnoses were also considered and will be evaluated: Peptic Ulcer Disease, Pancreatitis and Gastritis.    Dictation #1  MRN:[id number]  CSN:[id number]        Attending Attestation:   Physician Attestation Statement for Resident:  As the supervising MD   Physician Attestation Statement: I have personally seen and examined this patient.   I agree with the above history.  -:     As the supervising MD  I agree with the above treatment, course, plan, and disposition.    I have reviewed and agree with the residents interpretation of the following: lab data.                                        Clinical Impression:  Final diagnoses:  [R11.2] Nausea and vomiting, unspecified vomiting type (Primary)          ED Disposition Condition    Discharge Stable          ED Prescriptions       Medication Sig Dispense Start Date End Date Auth. Provider    omeprazole (PRILOSEC) 20 MG capsule Take 1 capsule (20 mg total) by mouth once daily. 30 capsule 7/9/2024 -- Julita Tarango MD    ondansetron (ZOFRAN-ODT) 4 MG TbDL Take 1 tablet (4 mg total) by mouth every 8 (eight) hours as needed. 12 tablet 7/9/2024 -- Julita Tarango MD          Follow-up Information       Follow up With Specialties Details Why Contact Info    [facility] Emergency Medicine  If symptoms worsen [facility] [facility] [id number] [phone]    [facility] Internal Medicine In 2 weeks  [facility] [facility] [id number] [phone]             Julita Tarango MD  Resident  07/09/24 0311       Luciano Villanueva MD  07/10/24 0792